# Patient Record
Sex: FEMALE | Race: WHITE | Employment: UNEMPLOYED | URBAN - METROPOLITAN AREA
[De-identification: names, ages, dates, MRNs, and addresses within clinical notes are randomized per-mention and may not be internally consistent; named-entity substitution may affect disease eponyms.]

---

## 2018-06-20 PROCEDURE — 88175 CYTOPATH C/V AUTO FLUID REDO: CPT | Performed by: OBSTETRICS & GYNECOLOGY

## 2019-02-04 NOTE — PROGRESS NOTES
HPI:   Donny Aguilar is a 50year old female who presents with right elbow pain     Pt had not played in years  Pt tried on line therapies and ice  It started in July ; better when she took a break from exercise   Pt went back to exercise and the triceps   EXTREMITIES: no cyanosis, clubbing or edema  NEURO: cranial nerves are intact,motor and sensory are grossly intact    ASSESSMENT AND PLAN:   Marquise Beebe is a 50year old female who presents for     1.  Right tennis elbow    - OP REFERRA

## 2019-02-05 ENCOUNTER — OFFICE VISIT (OUTPATIENT)
Dept: FAMILY MEDICINE CLINIC | Facility: CLINIC | Age: 49
End: 2019-02-05
Payer: OTHER GOVERNMENT

## 2019-02-05 VITALS
BODY MASS INDEX: 24.71 KG/M2 | OXYGEN SATURATION: 98 % | HEART RATE: 94 BPM | DIASTOLIC BLOOD PRESSURE: 78 MMHG | WEIGHT: 163 LBS | RESPIRATION RATE: 16 BRPM | TEMPERATURE: 98 F | SYSTOLIC BLOOD PRESSURE: 108 MMHG | HEIGHT: 68 IN

## 2019-02-05 DIAGNOSIS — M77.8 TRICEPS TENDINITIS: ICD-10-CM

## 2019-02-05 DIAGNOSIS — M77.11 RIGHT TENNIS ELBOW: Primary | ICD-10-CM

## 2019-02-05 PROCEDURE — 99202 OFFICE O/P NEW SF 15 MIN: CPT | Performed by: FAMILY MEDICINE

## 2019-02-05 RX ORDER — FLUCONAZOLE 150 MG/1
150 TABLET ORAL ONCE
COMMUNITY
End: 2019-10-15 | Stop reason: ALTCHOICE

## 2019-02-12 ENCOUNTER — HOSPITAL ENCOUNTER (OUTPATIENT)
Dept: PHYSICAL THERAPY | Facility: HOSPITAL | Age: 49
Setting detail: THERAPIES SERIES
Discharge: HOME | End: 2019-02-12
Attending: FAMILY MEDICINE
Payer: OTHER GOVERNMENT

## 2019-02-12 DIAGNOSIS — M77.8 TRICEPS TENDINITIS: ICD-10-CM

## 2019-02-12 DIAGNOSIS — M77.11 RIGHT TENNIS ELBOW: ICD-10-CM

## 2019-02-12 PROCEDURE — 97161 PT EVAL LOW COMPLEX 20 MIN: CPT

## 2019-02-12 PROCEDURE — 97110 THERAPEUTIC EXERCISES: CPT

## 2019-02-12 NOTE — PROGRESS NOTES
UPPER EXTREMITY EVALUATION:   Referring Physician: Dr. Robby Negro  Diagnosis: R tennis elbow, triceps tendinitis     Date of Service: 2/12/2019     PATIENT SUMMARY   Too Quintero is a 50year old RHD female who presents to therapy today with compla from a controlled tendon loading program.  [place] Reanna would benefit from skilled Physical Therapy to address the above impairments to decrease pain and return to prior level of function.     Precautions:  None  OBJECTIVE:   Cervical Screen:  Negative flexion, ex None  Rehab Potential:good    FOTO: 47/100      Patient/Family/Caregiver was advised of these findings, precautions, and treatment options and has agreed to actively participate in planning and for this course of care.     Thank you for your referral. Chris Weber

## 2019-02-14 ENCOUNTER — HOSPITAL ENCOUNTER (OUTPATIENT)
Dept: PHYSICAL THERAPY | Facility: HOSPITAL | Age: 49
Setting detail: THERAPIES SERIES
Discharge: HOME | End: 2019-02-14
Attending: FAMILY MEDICINE
Payer: OTHER GOVERNMENT

## 2019-02-14 PROCEDURE — 97140 MANUAL THERAPY 1/> REGIONS: CPT

## 2019-02-14 PROCEDURE — 97110 THERAPEUTIC EXERCISES: CPT

## 2019-02-14 NOTE — PHYSICAL THERAPY NOTE
Dx: R tennis elbow, tricep tendinitis         Authorized # of Visits:  8         Next MD visit: none scheduled  Fall Risk: standard         Precautions: n/a             Subjective: Sometimes good sometimes not. States he can perform wrist flexion w/o pain. 15                                                      Skilled Services: IAST[facility], joint mobilization, STM    Charges: manual x 2, therEx x 1       Total Timed Treatment: 45 min  Total Treatment Time: 45 min

## 2019-02-19 ENCOUNTER — HOSPITAL ENCOUNTER (OUTPATIENT)
Dept: PHYSICAL THERAPY | Facility: HOSPITAL | Age: 49
Setting detail: THERAPIES SERIES
Discharge: HOME | End: 2019-02-19
Attending: FAMILY MEDICINE
Payer: OTHER GOVERNMENT

## 2019-02-19 PROCEDURE — 97140 MANUAL THERAPY 1/> REGIONS: CPT

## 2019-02-19 PROCEDURE — 97110 THERAPEUTIC EXERCISES: CPT

## 2019-02-19 NOTE — PHYSICAL THERAPY NOTE
Dx: R tennis elbow, tricep tendinitis         Authorized # of Visits:  8         Next MD visit: none scheduled  Fall Risk: standard         Precautions: n/a             Subjective: Elbow feels the same. Difficulty with HEP, had exercises backward.  Gripping Supination YLW TB 2 x 15 Wrist extension 1# 1 x 15                                                     Skilled Services: joint mobilization    Charges: manual x 2, therEx x 1       Total Timed Treatment: 45 min  Total Treatment Time: 45 min

## 2019-02-21 ENCOUNTER — APPOINTMENT (OUTPATIENT)
Dept: PHYSICAL THERAPY | Facility: HOSPITAL | Age: 49
End: 2019-02-21
Attending: FAMILY MEDICINE
Payer: OTHER GOVERNMENT

## 2019-02-26 ENCOUNTER — APPOINTMENT (OUTPATIENT)
Dept: PHYSICAL THERAPY | Facility: HOSPITAL | Age: 49
End: 2019-02-26
Attending: FAMILY MEDICINE
Payer: OTHER GOVERNMENT

## 2019-02-28 ENCOUNTER — APPOINTMENT (OUTPATIENT)
Dept: PHYSICAL THERAPY | Facility: HOSPITAL | Age: 49
End: 2019-02-28
Attending: FAMILY MEDICINE
Payer: OTHER GOVERNMENT

## 2019-03-05 ENCOUNTER — HOSPITAL ENCOUNTER (OUTPATIENT)
Dept: PHYSICAL THERAPY | Facility: HOSPITAL | Age: 49
Setting detail: THERAPIES SERIES
Discharge: HOME | End: 2019-03-05
Attending: FAMILY MEDICINE
Payer: OTHER GOVERNMENT

## 2019-03-05 PROCEDURE — 97140 MANUAL THERAPY 1/> REGIONS: CPT

## 2019-03-05 NOTE — PHYSICAL THERAPY NOTE
Dx: R tennis elbow, tricep tendinitis         Authorized # of Visits:  8         Next MD visit: none scheduled  Fall Risk: standard         Precautions: n/a             Subjective: Elbow feels the same but tricep area feels better.  [place] extensors       Pronation 3# 2 x 15 Supination YLW TB 2 x 15 ICE       Supination YLW TB 2 x 15 Wrist extension 1# 1 x 15                                                     Skilled Services: joint mobilization, STM    Charges: manual x 3, ICE - NC       T

## 2019-03-07 ENCOUNTER — HOSPITAL ENCOUNTER (OUTPATIENT)
Dept: PHYSICAL THERAPY | Facility: HOSPITAL | Age: 49
Setting detail: THERAPIES SERIES
Discharge: HOME | End: 2019-03-07
Attending: FAMILY MEDICINE
Payer: OTHER GOVERNMENT

## 2019-03-07 PROCEDURE — 97140 MANUAL THERAPY 1/> REGIONS: CPT

## 2019-03-07 PROCEDURE — 97110 THERAPEUTIC EXERCISES: CPT

## 2019-03-07 NOTE — PHYSICAL THERAPY NOTE
Dx: R tennis elbow, tricep tendinitis         Authorized # of Visits:  8         Next MD visit: none scheduled  Fall Risk: standard         Precautions: n/a             Subjective: Elbow feels pretty good today. Does compensate with L arm.  Stretching it fe

## 2019-03-14 ENCOUNTER — HOSPITAL ENCOUNTER (OUTPATIENT)
Dept: PHYSICAL THERAPY | Facility: HOSPITAL | Age: 49
Setting detail: THERAPIES SERIES
Discharge: HOME | End: 2019-03-14
Attending: FAMILY MEDICINE
Payer: OTHER GOVERNMENT

## 2019-03-14 PROCEDURE — 97140 MANUAL THERAPY 1/> REGIONS: CPT

## 2019-03-14 PROCEDURE — 97110 THERAPEUTIC EXERCISES: CPT

## 2019-03-14 NOTE — PHYSICAL THERAPY NOTE
Dx: R tennis elbow, tricep tendinitis         Authorized # of Visits:  8         Next MD visit: none scheduled  Fall Risk: standard         Precautions: n/a             Subjective: Elbow is feeling better, able to  glass of water without pain.  No pa 1# 2 x 6     Supination YLW TB 2 x 15 Wrist extension 1# 1 x 15                                                     Skilled Services: [facility]    Charges: manual x 1, therEx x 2, ICE - NC       Total Timed Treatment: 45 min  Total Treatment Time: 45 min

## 2019-03-18 ENCOUNTER — HOSPITAL ENCOUNTER (OUTPATIENT)
Dept: PHYSICAL THERAPY | Facility: HOSPITAL | Age: 49
Setting detail: THERAPIES SERIES
Discharge: HOME | End: 2019-03-18
Attending: FAMILY MEDICINE
Payer: OTHER GOVERNMENT

## 2019-03-18 ENCOUNTER — APPOINTMENT (OUTPATIENT)
Dept: PHYSICAL THERAPY | Facility: HOSPITAL | Age: 49
End: 2019-03-18
Attending: FAMILY MEDICINE
Payer: OTHER GOVERNMENT

## 2019-03-18 PROCEDURE — 97140 MANUAL THERAPY 1/> REGIONS: CPT

## 2019-03-18 PROCEDURE — 97110 THERAPEUTIC EXERCISES: CPT

## 2019-03-18 NOTE — PHYSICAL THERAPY NOTE
Dx: R tennis elbow, tricep tendinitis         Authorized # of Visits:  8         Next MD visit: none scheduled  Fall Risk: standard         Precautions: n/a             Subjective: Elbow is feeling worse since last session, not as bad as initial. Waking up Blue TB 2 x 15 Pronation/supination Roller w/ 1# 3 x 5 Wrist ext 3# 3 x 10    Pronation 3# 2 x 15 Supination YLW TB 2 x 15 ICE  Radial Dev Roller w/ 1# 2 x 6 Pro/sup roller 3 x 10    Supination YLW TB 2 x 15 Wrist extension 1# 1 x 15    Radial dev roller 2

## 2019-03-21 ENCOUNTER — HOSPITAL ENCOUNTER (OUTPATIENT)
Dept: PHYSICAL THERAPY | Facility: HOSPITAL | Age: 49
Setting detail: THERAPIES SERIES
Discharge: HOME | End: 2019-03-21
Attending: FAMILY MEDICINE
Payer: OTHER GOVERNMENT

## 2019-03-21 PROCEDURE — 97110 THERAPEUTIC EXERCISES: CPT

## 2019-03-21 PROCEDURE — 97140 MANUAL THERAPY 1/> REGIONS: CPT

## 2019-03-21 NOTE — PHYSICAL THERAPY NOTE
Dx: R tennis elbow, tricep tendinitis         Authorized # of Visits:  8         Next MD visit: none scheduled  Fall Risk: standard         Precautions: n/a             Subjective: Feeling a little a better than last session.  Feels elbow is headed back in Pronation/Supination Blue TB 2 x 15 Pronation/supination Roller w/ 1# 3 x 5 Wrist ext 3# 3 x 10 Wrist Ext 2# 2 x 8 + dorsal radial glide   Pronation 3# 2 x 15 Supination YLW TB 2 x 15 ICE  Radial Dev Roller w/ 1# 2 x 6 Pro/sup roller 3 x 10 Wrist Ext YLW T

## 2019-04-02 ENCOUNTER — HOSPITAL ENCOUNTER (OUTPATIENT)
Dept: PHYSICAL THERAPY | Facility: HOSPITAL | Age: 49
Setting detail: THERAPIES SERIES
Discharge: HOME | End: 2019-04-02
Attending: FAMILY MEDICINE
Payer: OTHER GOVERNMENT

## 2019-04-02 ENCOUNTER — APPOINTMENT (OUTPATIENT)
Dept: PHYSICAL THERAPY | Facility: HOSPITAL | Age: 49
End: 2019-04-02
Payer: OTHER GOVERNMENT

## 2019-04-02 PROCEDURE — 97140 MANUAL THERAPY 1/> REGIONS: CPT

## 2019-04-02 PROCEDURE — 97110 THERAPEUTIC EXERCISES: CPT

## 2019-04-02 NOTE — PHYSICAL THERAPY NOTE
Dx: R tennis elbow, tricep tendinitis         Authorized # of Visits:  8         Next MD visit: none scheduled  Fall Risk: standard         Precautions: n/a             Subjective: Wrist is feeling maybe a bit better following trip, had to carry a lot of l 15 Wrist extension 2# 1 x 20, 3# 1 x 10, 5# 2 x 8 Radial dorsal glides Radial dorsal glides x 10' + between exercises MWM manual pressure on extensors sets of 15 between all exercises       AROM wrist ext - STM & rad head glide AROM wrist ext - STM & rad h

## 2019-04-04 ENCOUNTER — APPOINTMENT (OUTPATIENT)
Dept: PHYSICAL THERAPY | Facility: HOSPITAL | Age: 49
End: 2019-04-04
Payer: OTHER GOVERNMENT

## 2019-04-09 ENCOUNTER — HOSPITAL ENCOUNTER (OUTPATIENT)
Dept: PHYSICAL THERAPY | Facility: HOSPITAL | Age: 49
Setting detail: THERAPIES SERIES
Discharge: HOME | End: 2019-04-09
Attending: FAMILY MEDICINE
Payer: OTHER GOVERNMENT

## 2019-04-09 PROCEDURE — 97140 MANUAL THERAPY 1/> REGIONS: CPT

## 2019-04-09 PROCEDURE — 97110 THERAPEUTIC EXERCISES: CPT

## 2019-04-09 NOTE — PHYSICAL THERAPY NOTE
Dx: R tennis elbow, tricep tendinitis         Authorized # of Visits:  8         Next MD visit: none scheduled  Fall Risk: standard         Precautions: n/a             Subjective: It is better than the first day of PT. Feels it is 65% better.        Object exercises      AROM wrist ext - STM & rad head glide AROM wrist ext - STM & rad head glide STM to extensors Pronation/Supination Blue TB 2 x 15 Pronation/supination Roller w/ 1# 3 x 5 Wrist ext 3# 3 x 10 Wrist Ext 2# 2 x 8 + dorsal radial glide Wrist Ext/r

## 2019-04-11 ENCOUNTER — APPOINTMENT (OUTPATIENT)
Dept: PHYSICAL THERAPY | Facility: HOSPITAL | Age: 49
End: 2019-04-11
Payer: OTHER GOVERNMENT

## 2019-04-16 ENCOUNTER — HOSPITAL ENCOUNTER (OUTPATIENT)
Dept: PHYSICAL THERAPY | Facility: HOSPITAL | Age: 49
Setting detail: THERAPIES SERIES
Discharge: HOME | End: 2019-04-16
Attending: FAMILY MEDICINE
Payer: OTHER GOVERNMENT

## 2019-04-16 PROCEDURE — 97140 MANUAL THERAPY 1/> REGIONS: CPT

## 2019-04-16 PROCEDURE — 97110 THERAPEUTIC EXERCISES: CPT

## 2019-04-16 NOTE — PHYSICAL THERAPY NOTE
Dx: R tennis elbow, tricep tendinitis         Authorized # of Visits:  12         Next MD visit: none scheduled  Fall Risk: standard         Precautions: n/a             Subjective: Exercises at home with tennis racket were difficult and made the elbow hilary extensors sets of 15 between all exercises  MWM manual pressure on extensors x 5'    AROM wrist ext - STM & rad head glide AROM wrist ext - STM & rad head glide STM to extensors Pronation/Supination Blue TB 2 x 15 Pronation/supination Roller w/ 1# 3 x 5 Wr

## 2019-04-30 ENCOUNTER — HOSPITAL ENCOUNTER (OUTPATIENT)
Dept: PHYSICAL THERAPY | Facility: HOSPITAL | Age: 49
Setting detail: THERAPIES SERIES
Discharge: HOME | End: 2019-04-30
Attending: FAMILY MEDICINE
Payer: OTHER GOVERNMENT

## 2019-04-30 PROCEDURE — 97110 THERAPEUTIC EXERCISES: CPT

## 2019-04-30 NOTE — PHYSICAL THERAPY NOTE
Dx: R tennis elbow, tricep tendinitis         Authorized # of Visits:  12         Next MD visit: none scheduled  Fall Risk: standard         Precautions: n/a             Subjective: Getting frustrated with the elbow because it seems to not be making progre extension hammer 3 x 10 Tricep ext Red 3 x 15 Bodyblade 2 x 15\" each ER/IR, flex, abd   STM to extensor tendons & brachioradialis x 10' Radial deviation in elbow extension hammer 3 x 10 Rebounder throws 500g 3 x 10, 1kg 3 x 10 Punching foam pad 2 x 15 (im

## 2019-05-07 ENCOUNTER — HOSPITAL ENCOUNTER (OUTPATIENT)
Dept: PHYSICAL THERAPY | Facility: HOSPITAL | Age: 49
Setting detail: THERAPIES SERIES
Discharge: HOME | End: 2019-05-07
Attending: FAMILY MEDICINE
Payer: OTHER GOVERNMENT

## 2019-05-07 PROCEDURE — 97110 THERAPEUTIC EXERCISES: CPT

## 2019-05-07 NOTE — PHYSICAL THERAPY NOTE
Dx: R tennis elbow, tricep tendinitis         Authorized # of Visits:  15         Next MD visit: none scheduled  Fall Risk: standard         Precautions: n/a             Subjective: Feels that elbow is still the same and not making any progress.  Was ryanige fatigue (12-20) Pronation/supination in elbow extension hammer 3 x 10 Tricep ext Red 3 x 15 Bodyblade 2 x 15\" each ER/IR, flex, abd Bodyblade 2 x 20\" each ER/IR, flex, ab   STM to extensor tendons & brachioradialis x 10' Radial deviation in elbow extensi

## 2019-05-14 ENCOUNTER — APPOINTMENT (OUTPATIENT)
Dept: PHYSICAL THERAPY | Facility: HOSPITAL | Age: 49
End: 2019-05-14
Attending: FAMILY MEDICINE
Payer: OTHER GOVERNMENT

## 2019-05-21 ENCOUNTER — TELEPHONE (OUTPATIENT)
Dept: FAMILY MEDICINE CLINIC | Facility: CLINIC | Age: 49
End: 2019-05-21

## 2019-05-21 DIAGNOSIS — M77.11 LATERAL EPICONDYLITIS OF RIGHT ELBOW: Primary | ICD-10-CM

## 2019-05-21 DIAGNOSIS — Z12.39 SCREENING BREAST EXAMINATION: Primary | ICD-10-CM

## 2019-05-21 NOTE — TELEPHONE ENCOUNTER
Patient calling about right tennis elbow. Has done physical therapy, is about 70% better. She thinks she feels a bone spur on her elbow. Appt, referral to Ortho? Has pain if touched.

## 2019-05-21 NOTE — TELEPHONE ENCOUNTER
Patient had [facility] of breasts. Per Dr. Ge Muller patient needs mammogram.   Called patient to inform her of mammogram order and she stated she is going back to [facility] for other tests and will discuss with Florrie Nageotte .

## 2019-05-23 ENCOUNTER — HOSPITAL ENCOUNTER (OUTPATIENT)
Dept: PHYSICAL THERAPY | Facility: HOSPITAL | Age: 49
Setting detail: THERAPIES SERIES
Discharge: HOME | End: 2019-05-23
Attending: FAMILY MEDICINE
Payer: OTHER GOVERNMENT

## 2019-05-23 PROCEDURE — 97110 THERAPEUTIC EXERCISES: CPT

## 2019-05-23 NOTE — PHYSICAL THERAPY NOTE
Dx: R tennis elbow, tricep tendinitis         Authorized # of Visits:  15         Next MD visit: none scheduled  Fall Risk: standard         Precautions: n/a             Subjective: Feels that elbow is getting better following last session.  States she is a curl 5# 4 x fatigue (12-20) Pronation/supination in elbow extension hammer 3 x 10 Tricep ext Red 3 x 15 Bodyblade 2 x 15\" each ER/IR, flex, abd Bodyblade 2 x 20\" each ER/IR, flex, ab Tennis swing w/ TB serve, marianne, backhand x 10 each   STM to extenso

## 2019-10-15 ENCOUNTER — OFFICE VISIT (OUTPATIENT)
Dept: FAMILY MEDICINE CLINIC | Facility: CLINIC | Age: 49
End: 2019-10-15
Payer: OTHER GOVERNMENT

## 2019-10-15 VITALS
DIASTOLIC BLOOD PRESSURE: 64 MMHG | BODY MASS INDEX: 24.4 KG/M2 | SYSTOLIC BLOOD PRESSURE: 98 MMHG | RESPIRATION RATE: 16 BRPM | HEART RATE: 94 BPM | TEMPERATURE: 98 F | HEIGHT: 68 IN | WEIGHT: 161 LBS | OXYGEN SATURATION: 98 %

## 2019-10-15 DIAGNOSIS — Z00.00 GENERAL MEDICAL EXAM: Primary | ICD-10-CM

## 2019-10-15 DIAGNOSIS — E55.9 VITAMIN D DEFICIENCY: ICD-10-CM

## 2019-10-15 DIAGNOSIS — S76.311A STRAIN OF RIGHT HAMSTRING, INITIAL ENCOUNTER: ICD-10-CM

## 2019-10-15 DIAGNOSIS — R00.2 PALPITATIONS: ICD-10-CM

## 2019-10-15 PROCEDURE — 99214 OFFICE O/P EST MOD 30 MIN: CPT | Performed by: FAMILY MEDICINE

## 2019-10-15 NOTE — PROGRESS NOTES
oTo Quintero is a 52year old female here to discuss anxiety, palpations and       Pt in therapy for herself and in couples therapy - mom has dementia / daughter has depression / son has school refusal for gender dysphoria / step son passed away thyromegaly   HEART: normal   LUNGS: clear  Right hamstring: + tight and tender     ASSESSMENT AND PLAN:     1. General medical exam    - HEMOGLOBIN A1C; Future  - FREE T4 (FREE THYROXINE); Future  - ASSAY, THYROID STIM HORMONE; Future  - LIPID PANEL;  Futu

## 2019-10-16 ENCOUNTER — HOSPITAL ENCOUNTER (OUTPATIENT)
Dept: CV DIAGNOSTICS | Facility: HOSPITAL | Age: 49
Discharge: HOME | End: 2019-10-16
Attending: FAMILY MEDICINE
Payer: OTHER GOVERNMENT

## 2019-10-16 DIAGNOSIS — R94.31 ABNORMAL EKG: ICD-10-CM

## 2019-10-16 DIAGNOSIS — R00.2 INTERMITTENT PALPITATIONS: ICD-10-CM

## 2019-10-16 DIAGNOSIS — R00.2 PALPITATIONS: ICD-10-CM

## 2019-10-16 PROCEDURE — 93306 TTE W/DOPPLER COMPLETE: CPT | Performed by: FAMILY MEDICINE

## 2019-10-16 PROCEDURE — 93225 XTRNL ECG REC<48 HRS REC: CPT | Performed by: FAMILY MEDICINE

## 2019-10-16 PROCEDURE — 93226 XTRNL ECG REC<48 HR SCAN A/R: CPT | Performed by: FAMILY MEDICINE

## 2019-10-16 PROCEDURE — 93227 XTRNL ECG REC<48 HR R&I: CPT | Performed by: FAMILY MEDICINE

## 2019-10-21 ENCOUNTER — LAB ENCOUNTER (OUTPATIENT)
Dept: LAB | Facility: HOSPITAL | Age: 49
End: 2019-10-21
Attending: FAMILY MEDICINE
Payer: OTHER GOVERNMENT

## 2019-10-21 DIAGNOSIS — Z00.00 GENERAL MEDICAL EXAM: ICD-10-CM

## 2019-10-21 PROCEDURE — 82306 VITAMIN D 25 HYDROXY: CPT

## 2019-10-21 PROCEDURE — 84443 ASSAY THYROID STIM HORMONE: CPT

## 2019-10-21 PROCEDURE — 84439 ASSAY OF FREE THYROXINE: CPT

## 2019-10-21 PROCEDURE — 80061 LIPID PANEL: CPT

## 2019-10-21 PROCEDURE — 85025 COMPLETE CBC W/AUTO DIFF WBC: CPT

## 2019-10-21 PROCEDURE — 80053 COMPREHEN METABOLIC PANEL: CPT

## 2019-10-21 PROCEDURE — 83036 HEMOGLOBIN GLYCOSYLATED A1C: CPT

## 2019-10-21 PROCEDURE — 82607 VITAMIN B-12: CPT

## 2019-10-21 PROCEDURE — 36415 COLL VENOUS BLD VENIPUNCTURE: CPT

## 2019-12-04 ENCOUNTER — HOSPITAL ENCOUNTER (OUTPATIENT)
Dept: PHYSICAL THERAPY | Facility: HOSPITAL | Age: 49
Setting detail: THERAPIES SERIES
Discharge: HOME | End: 2019-12-04
Attending: FAMILY MEDICINE
Payer: OTHER GOVERNMENT

## 2019-12-04 DIAGNOSIS — S76.311A STRAIN OF RIGHT HAMSTRING, INITIAL ENCOUNTER: ICD-10-CM

## 2019-12-04 PROCEDURE — 97110 THERAPEUTIC EXERCISES: CPT

## 2019-12-04 PROCEDURE — 97161 PT EVAL LOW COMPLEX 20 MIN: CPT

## 2019-12-04 PROCEDURE — 97140 MANUAL THERAPY 1/> REGIONS: CPT

## 2019-12-04 NOTE — PROGRESS NOTES
LOWER EXTREMITY EVALUATION:   Referring Physician: Dr. Raina Murphy  Diagnosis: R hamstring strain     Date of Service: 12/4/2019     PATIENT SUMMARY   Warner Hess is a 52year old female who presents to therapy today with complaints of R hamstrin .intact light touch, symmetrically, no neuro complaints     AROM: (* denotes performed with pain)  Hip-wnl B throughout Knee-wnl B throughout Foot/Ankle-wnl B throughout             Lumbar flexion: 90 - tightness R>L             Extension 40     Accessory and run without limitations   · Pt will be independent and compliant with comprehensive HEP to maintain progress achieved in PT       Frequency / Duration: Patient will be seen for 2 x/week or a total of 8 visits over a 90 day period.  Treatment will includ

## 2019-12-09 ENCOUNTER — TELEPHONE (OUTPATIENT)
Dept: PHYSICAL THERAPY | Facility: HOSPITAL | Age: 49
End: 2019-12-09

## 2019-12-09 NOTE — TELEPHONE ENCOUNTER
Called pt regarding no show visit in physical therapy. Pt reports that she forgot. Confirmed next appt.

## 2019-12-10 ENCOUNTER — APPOINTMENT (OUTPATIENT)
Dept: PHYSICAL THERAPY | Facility: HOSPITAL | Age: 49
End: 2019-12-10
Attending: FAMILY MEDICINE
Payer: OTHER GOVERNMENT

## 2019-12-12 ENCOUNTER — HOSPITAL ENCOUNTER (OUTPATIENT)
Dept: PHYSICAL THERAPY | Facility: HOSPITAL | Age: 49
Setting detail: THERAPIES SERIES
Discharge: HOME | End: 2019-12-12
Attending: FAMILY MEDICINE
Payer: OTHER GOVERNMENT

## 2019-12-12 ENCOUNTER — OFFICE VISIT (OUTPATIENT)
Dept: FAMILY MEDICINE CLINIC | Facility: CLINIC | Age: 49
End: 2019-12-12
Payer: OTHER GOVERNMENT

## 2019-12-12 VITALS
TEMPERATURE: 99 F | OXYGEN SATURATION: 98 % | SYSTOLIC BLOOD PRESSURE: 102 MMHG | BODY MASS INDEX: 24.4 KG/M2 | DIASTOLIC BLOOD PRESSURE: 68 MMHG | WEIGHT: 161 LBS | HEART RATE: 59 BPM | RESPIRATION RATE: 16 BRPM | HEIGHT: 68 IN

## 2019-12-12 DIAGNOSIS — Z01.419 WELL WOMAN EXAM WITH ROUTINE GYNECOLOGICAL EXAM: Primary | ICD-10-CM

## 2019-12-12 DIAGNOSIS — Z12.31 ENCOUNTER FOR SCREENING MAMMOGRAM FOR HIGH-RISK PATIENT: ICD-10-CM

## 2019-12-12 DIAGNOSIS — Z00.00 ANNUAL PHYSICAL EXAM: ICD-10-CM

## 2019-12-12 DIAGNOSIS — Z12.11 COLON CANCER SCREENING: ICD-10-CM

## 2019-12-12 DIAGNOSIS — R00.2 PALPITATIONS: ICD-10-CM

## 2019-12-12 PROCEDURE — 88175 CYTOPATH C/V AUTO FLUID REDO: CPT | Performed by: FAMILY MEDICINE

## 2019-12-12 PROCEDURE — 99396 PREV VISIT EST AGE 40-64: CPT | Performed by: FAMILY MEDICINE

## 2019-12-12 PROCEDURE — 97110 THERAPEUTIC EXERCISES: CPT

## 2019-12-12 PROCEDURE — 87624 HPV HI-RISK TYP POOLED RSLT: CPT | Performed by: FAMILY MEDICINE

## 2019-12-12 PROCEDURE — 97140 MANUAL THERAPY 1/> REGIONS: CPT

## 2019-12-12 NOTE — PROGRESS NOTES
HPI:   Alba Josue is a 52year old female who presents for a complete physical exam.     Wt Readings from Last 6 Encounters:  12/12/19 : 161 lb (73 kg)  10/15/19 : 161 lb (73 kg)  02/05/19 : 163 lb (73.9 kg)  06/20/18 : 165 lb 12.8 oz (75.2 kg) • Other (Alzheimers) Paternal Grandmother    • Other (Heart failure) Paternal Grandfather    • Breast Cancer Neg       Social History:   Social History    Tobacco Use      Smoking status: Never Smoker      Smokeless tobacco: Never Used    Alcohol use: Mela Vasquez negative  MUSCULOSKELETAL: back is not tender,FROM of the back  EXTREMITIES: no cyanosis, clubbing or edema  NEURO: cranial nerves are intact,motor and sensory are grossly intact    ASSESSMENT AND PLAN:   Nitish Fleming is a 52year old female who

## 2019-12-12 NOTE — PROGRESS NOTES
Dx:  R hamstring strain           Authorized # of Visits:  No limit, no auth         Next MD visit: none scheduled  Fall Risk: standard         Precautions: n/a             Subjective: Had pain with running R hamstring.   Current functional limitations/Init wasn't noted at Initial evaluation: during 1x manual muscle testing.       Goals:   Goals: (to be met in 8 visits)-progressing  · Pt will improve hip ext and ABD to 5-/5 to increase ease with running   · Pt will improve hip flexibility to wfl to decrease st

## 2019-12-16 ENCOUNTER — HOSPITAL ENCOUNTER (OUTPATIENT)
Dept: PHYSICAL THERAPY | Facility: HOSPITAL | Age: 49
Setting detail: THERAPIES SERIES
Discharge: HOME | End: 2019-12-16
Attending: FAMILY MEDICINE
Payer: OTHER GOVERNMENT

## 2019-12-16 PROCEDURE — 97140 MANUAL THERAPY 1/> REGIONS: CPT

## 2019-12-16 PROCEDURE — 97110 THERAPEUTIC EXERCISES: CPT

## 2019-12-16 NOTE — PROGRESS NOTES
Dx:  R hamstring strain           Authorized # of Visits:  No limit, no auth         Next MD visit: none scheduled  Fall Risk: standard         Precautions: n/a             Subjective: R hamstring feeling better. Tape helped with decreasing pain.   Current hamstring strengthening 3 ways x10 , prone hamstring strengthening YTB prn x10 BID    Goals:   Goals: (to be met in 8 visits)-progressing  · Pt will improve hip ext and ABD to 5-/5 to increase ease with running   · Pt will improve hip flexibility to wfl to Time: 45 min

## 2019-12-23 ENCOUNTER — HOSPITAL ENCOUNTER (OUTPATIENT)
Dept: PHYSICAL THERAPY | Facility: HOSPITAL | Age: 49
Setting detail: THERAPIES SERIES
Discharge: HOME | End: 2019-12-23
Attending: FAMILY MEDICINE
Payer: OTHER GOVERNMENT

## 2019-12-23 PROCEDURE — 97035 APP MDLTY 1+ULTRASOUND EA 15: CPT

## 2019-12-23 PROCEDURE — 97140 MANUAL THERAPY 1/> REGIONS: CPT

## 2019-12-23 NOTE — PROGRESS NOTES
Dx:  R hamstring strain           Authorized # of Visits:  No limit, no auth         Next MD visit: none scheduled  Fall Risk: standard         Precautions: n/a             Subjective: Patient reports R hamstring doesn't seem to be improving, stating she s tightness/tenderness with palpation to assist with improved hamstring length to -20 B and more neutral posture to improve ability to walk and run without limitations   · Pt will be independent and compliant with comprehensive HEP to maintain progress achie

## 2019-12-26 ENCOUNTER — HOSPITAL ENCOUNTER (OUTPATIENT)
Dept: PHYSICAL THERAPY | Facility: HOSPITAL | Age: 49
Setting detail: THERAPIES SERIES
Discharge: HOME | End: 2019-12-26
Attending: FAMILY MEDICINE
Payer: OTHER GOVERNMENT

## 2019-12-26 PROCEDURE — 97035 APP MDLTY 1+ULTRASOUND EA 15: CPT

## 2019-12-26 PROCEDURE — 97140 MANUAL THERAPY 1/> REGIONS: CPT

## 2019-12-26 NOTE — PROGRESS NOTES
Dx:  R hamstring strain           Authorized # of Visits:  No limit, no auth         Next MD visit: none scheduled  Fall Risk: standard         Precautions: n/a             Subjective: Patient reports her hamstring felt much following last PT session with with improved hamstring length to -20 B and more neutral posture to improve ability to walk and run without limitations   · Pt will be independent and compliant with comprehensive HEP to maintain progress achieved in PT     Plan: Continue per plan of care.

## 2019-12-31 ENCOUNTER — HOSPITAL ENCOUNTER (OUTPATIENT)
Dept: PHYSICAL THERAPY | Facility: HOSPITAL | Age: 49
Setting detail: THERAPIES SERIES
Discharge: HOME | End: 2019-12-31
Attending: FAMILY MEDICINE
Payer: OTHER GOVERNMENT

## 2019-12-31 PROCEDURE — 97035 APP MDLTY 1+ULTRASOUND EA 15: CPT

## 2019-12-31 PROCEDURE — 97140 MANUAL THERAPY 1/> REGIONS: CPT

## 2019-12-31 NOTE — PROGRESS NOTES
Dx:  R hamstring strain           Authorized # of Visits:  No limit, no auth         Next MD visit: none scheduled  Fall Risk: standard         Precautions: n/a             Subjective: R hamstring is feeling better with combination of stretching, strengthe hamstring tightness/tenderness with palpation to assist with improved hamstring length to -20 B and more neutral posture to improve ability to walk and run without limitations   · Pt will be independent and compliant with comprehensive HEP to maintain prog

## 2020-01-02 ENCOUNTER — APPOINTMENT (OUTPATIENT)
Dept: PHYSICAL THERAPY | Facility: HOSPITAL | Age: 50
End: 2020-01-02
Attending: FAMILY MEDICINE
Payer: OTHER GOVERNMENT

## 2020-01-07 ENCOUNTER — APPOINTMENT (OUTPATIENT)
Dept: PHYSICAL THERAPY | Facility: HOSPITAL | Age: 50
End: 2020-01-07
Payer: OTHER GOVERNMENT

## 2020-01-09 ENCOUNTER — OFFICE VISIT (OUTPATIENT)
Dept: PHYSICAL THERAPY | Facility: HOSPITAL | Age: 50
End: 2020-01-09
Attending: FAMILY MEDICINE
Payer: OTHER GOVERNMENT

## 2020-01-09 PROCEDURE — 97140 MANUAL THERAPY 1/> REGIONS: CPT

## 2020-01-09 PROCEDURE — 97035 APP MDLTY 1+ULTRASOUND EA 15: CPT

## 2020-01-09 NOTE — PROGRESS NOTES
Dx:  R hamstring strain           Authorized # of Visits:  No limit, no auth         Next MD visit: none scheduled  Fall Risk: standard         Precautions: n/a             Subjective: Patient reports her hamstring had been feeling better but she played te YTB 3x10-HEP    Standing HS stretch x1'-HEP    MARINO x10-HEP    DUGLAS x5 min-HEP   supine Sciatic nn glides R/L x20, HSS x1' R/L    Dynamic hamstring stretching , hamstring  contraction x1' ea    Sitting-  Pulleys  Knee flexion  YTB 3x10    Prone-MRE's R hams

## 2020-01-14 ENCOUNTER — OFFICE VISIT (OUTPATIENT)
Dept: PHYSICAL THERAPY | Facility: HOSPITAL | Age: 50
End: 2020-01-14
Attending: FAMILY MEDICINE
Payer: OTHER GOVERNMENT

## 2020-01-14 PROCEDURE — 97140 MANUAL THERAPY 1/> REGIONS: CPT

## 2020-01-14 PROCEDURE — 97035 APP MDLTY 1+ULTRASOUND EA 15: CPT

## 2020-01-14 NOTE — PROGRESS NOTES
Dx:  R hamstring strain           Authorized # of Visits:  No limit, no auth         Next MD visit: none scheduled  Fall Risk: standard         Precautions: n/a             Subjective: Patient reports she continues to not some discomfort in the medial hams x1'-HEP    MARINO x10-HEP    DUGLAS x5 min-HEP   supine Sciatic nn glides R/L x20, HSS x1' R/L    Dynamic hamstring stretching , hamstring  contraction x1' ea    Sitting-  Pulleys  Knee flexion  YTB 3x10    Prone-MRE's R hamstring 3 ways x10-HEP    Prone-hamstr

## 2020-01-16 ENCOUNTER — APPOINTMENT (OUTPATIENT)
Dept: PHYSICAL THERAPY | Facility: HOSPITAL | Age: 50
End: 2020-01-16
Attending: FAMILY MEDICINE
Payer: OTHER GOVERNMENT

## 2020-01-16 PROCEDURE — 97035 APP MDLTY 1+ULTRASOUND EA 15: CPT

## 2020-01-16 PROCEDURE — 97140 MANUAL THERAPY 1/> REGIONS: CPT

## 2020-01-16 PROCEDURE — 97110 THERAPEUTIC EXERCISES: CPT

## 2020-01-16 NOTE — PROGRESS NOTES
Dx:  R hamstring strain           Authorized # of Visits:  No limit, no auth         Next MD visit: none scheduled  Fall Risk: standard         Precautions: n/a             Subjective: Patient reports hamstring has been feeling really good since her visit ea-HEP    Sitting-  Pulleys  Knee flexion  YTB 3x10-HEP    Standing HS stretch x1'-HEP    MARINO x10-HEP    DUGLAS x5 min-HEP   supine Sciatic nn glides R/L x20, HSS x1' R/L    Dynamic hamstring stretching , hamstring  contraction x1' ea    Sitting-  Pulleys  K

## 2020-01-21 ENCOUNTER — OFFICE VISIT (OUTPATIENT)
Dept: PHYSICAL THERAPY | Facility: HOSPITAL | Age: 50
End: 2020-01-21
Attending: FAMILY MEDICINE
Payer: OTHER GOVERNMENT

## 2020-01-21 PROCEDURE — 97140 MANUAL THERAPY 1/> REGIONS: CPT

## 2020-01-21 PROCEDURE — 97035 APP MDLTY 1+ULTRASOUND EA 15: CPT

## 2020-01-21 NOTE — PROGRESS NOTES
Dx:  R hamstring strain           Authorized # of Visits:  No limit, no auth         Next MD visit: none scheduled  Fall Risk: standard         Precautions: n/a             Subjective: Patient reports R hip is feeling better but she is still aware of some musculotendinous junction medial HS x3'    GIASTM to R HS x15' total    Proximal HSS 2x30\"    Piriformis stretch 2x30\"        Charges: US,  2 MAN Total Timed Treatment: 48 min     Total Treatment Time: 48 min

## 2020-01-23 ENCOUNTER — TELEPHONE (OUTPATIENT)
Dept: PHYSICAL THERAPY | Facility: HOSPITAL | Age: 50
End: 2020-01-23

## 2020-01-23 ENCOUNTER — APPOINTMENT (OUTPATIENT)
Dept: PHYSICAL THERAPY | Facility: HOSPITAL | Age: 50
End: 2020-01-23
Payer: OTHER GOVERNMENT

## 2020-01-23 ENCOUNTER — OFFICE VISIT (OUTPATIENT)
Dept: PHYSICAL THERAPY | Facility: HOSPITAL | Age: 50
End: 2020-01-23
Attending: FAMILY MEDICINE
Payer: OTHER GOVERNMENT

## 2020-01-23 PROCEDURE — 97110 THERAPEUTIC EXERCISES: CPT

## 2020-01-23 PROCEDURE — 97035 APP MDLTY 1+ULTRASOUND EA 15: CPT

## 2020-01-23 PROCEDURE — 97140 MANUAL THERAPY 1/> REGIONS: CPT

## 2020-01-24 ENCOUNTER — TELEPHONE (OUTPATIENT)
Dept: FAMILY MEDICINE CLINIC | Facility: CLINIC | Age: 50
End: 2020-01-24

## 2020-01-24 NOTE — PROGRESS NOTES
Dx:  R hamstring strain           Authorized # of Visits:  No limit, no auth         Next MD visit: none scheduled  Fall Risk: standard         Precautions: n/a             Subjective: Patient reports hamstring has been feeling good but she had a massage y 2x30\"    Pt edu and HEP modification per above DTM R hip flexor/TFL/glut med x10'    ITB foam roll R x3'    R hip posterior-inferior and lateral glides w/ mob eblt in 90 deg flxn grade2-3    Quadruped rock back with towel roll for posterior glide x10    H

## 2020-01-24 NOTE — TELEPHONE ENCOUNTER
Patient needs  a letter stating that is was necessary for Dr. Terra Farah to check her vitamin d on 10/21/19  Her insurance does not want to cover it  She is sending in an appeal and needs a letter from the doctor with it  Please advise

## 2020-01-27 ENCOUNTER — TELEPHONE (OUTPATIENT)
Dept: PHYSICAL THERAPY | Facility: HOSPITAL | Age: 50
End: 2020-01-27

## 2020-01-28 ENCOUNTER — APPOINTMENT (OUTPATIENT)
Dept: PHYSICAL THERAPY | Facility: HOSPITAL | Age: 50
End: 2020-01-28
Attending: FAMILY MEDICINE
Payer: OTHER GOVERNMENT

## 2020-01-30 ENCOUNTER — APPOINTMENT (OUTPATIENT)
Dept: PHYSICAL THERAPY | Facility: HOSPITAL | Age: 50
End: 2020-01-30
Attending: FAMILY MEDICINE
Payer: OTHER GOVERNMENT

## 2020-02-13 ENCOUNTER — OFFICE VISIT (OUTPATIENT)
Dept: PHYSICAL THERAPY | Facility: HOSPITAL | Age: 50
End: 2020-02-13
Attending: FAMILY MEDICINE
Payer: OTHER GOVERNMENT

## 2020-02-13 PROCEDURE — 97110 THERAPEUTIC EXERCISES: CPT

## 2020-02-13 PROCEDURE — 97140 MANUAL THERAPY 1/> REGIONS: CPT

## 2020-02-13 NOTE — PROGRESS NOTES
Dx:  R hamstring strain           Authorized # of Visits:  No limit, no auth         Next MD visit: none scheduled  Fall Risk: standard         Precautions: n/a             Subjective: Patient reports hamstring has been doing well with only occasional pain flexor/TFL/glut med x10'    ITB foam roll R x3'    R hip posterior-inferior and lateral glides w/ mob eblt in 90 deg flxn grade2-3    Quadruped rock back with towel roll for posterior glide x10    HSS x30\" B    Piriformis stretch x30\" B DTM to proximal m

## 2020-03-10 ENCOUNTER — OFFICE VISIT (OUTPATIENT)
Dept: PHYSICAL THERAPY | Facility: HOSPITAL | Age: 50
End: 2020-03-10
Attending: FAMILY MEDICINE
Payer: OTHER GOVERNMENT

## 2020-03-10 PROCEDURE — 97110 THERAPEUTIC EXERCISES: CPT

## 2020-03-10 PROCEDURE — 97035 APP MDLTY 1+ULTRASOUND EA 15: CPT

## 2020-03-10 PROCEDURE — 97140 MANUAL THERAPY 1/> REGIONS: CPT

## 2020-03-10 NOTE — PROGRESS NOTES
Dx:  R hamstring strain           Authorized # of Visits:  No limit, no auth         Next MD visit: none scheduled  Fall Risk: standard         Precautions: n/a             Subjective: Patient reports her hamstring has been doing very well and is feeling a to R HS x8' total    Proximal HSS 2x30\"    Piriformis stretch 2x30\"     DTM to proximal medial HS x15'    GIASTM to R HS x13' total    Proximal HSS 2x30\"    Piriformis stretch 2x30\"      KT R medial hamstring-prox>distal    Reviewed stretching HEP DTM

## 2020-03-27 ENCOUNTER — TELEPHONE (OUTPATIENT)
Dept: PHYSICAL THERAPY | Facility: HOSPITAL | Age: 50
End: 2020-03-27

## 2020-03-27 NOTE — TELEPHONE ENCOUNTER
Contacted pt to discuss department's initiative to protect all non-essential and high risk patients from COVID-19 exposure. Discussed recommendations relative to pt's home program and communication via email as needed if status changes.   Patient does not w

## 2020-04-07 ENCOUNTER — APPOINTMENT (OUTPATIENT)
Dept: PHYSICAL THERAPY | Facility: HOSPITAL | Age: 50
End: 2020-04-07
Attending: FAMILY MEDICINE
Payer: OTHER GOVERNMENT

## 2020-04-09 ENCOUNTER — TELEPHONE (OUTPATIENT)
Dept: FAMILY MEDICINE CLINIC | Facility: CLINIC | Age: 50
End: 2020-04-09

## 2020-04-09 NOTE — TELEPHONE ENCOUNTER
Insurance did not cover vitamin D testing due to coding issue - code should be [id number], vitamin D dificiency  Pls call to discuss

## 2020-04-30 NOTE — TELEPHONE ENCOUNTER
Faxed corrected code E55.9 per Dr. Ana Almeida to [address] [place] [address] to resubmit to [facility] insurance.

## 2021-02-05 ENCOUNTER — LAB ENCOUNTER (OUTPATIENT)
Dept: LAB | Age: 51
End: 2021-02-05
Attending: PHYSICIAN ASSISTANT
Payer: OTHER GOVERNMENT

## 2021-02-05 ENCOUNTER — OFFICE VISIT (OUTPATIENT)
Dept: FAMILY MEDICINE CLINIC | Facility: CLINIC | Age: 51
End: 2021-02-05
Payer: OTHER GOVERNMENT

## 2021-02-05 VITALS
SYSTOLIC BLOOD PRESSURE: 104 MMHG | DIASTOLIC BLOOD PRESSURE: 72 MMHG | HEIGHT: 68 IN | BODY MASS INDEX: 25.79 KG/M2 | WEIGHT: 170.19 LBS | HEART RATE: 68 BPM | RESPIRATION RATE: 20 BRPM | TEMPERATURE: 98 F | OXYGEN SATURATION: 98 %

## 2021-02-05 DIAGNOSIS — Z13.1 SCREENING FOR DIABETES MELLITUS (DM): ICD-10-CM

## 2021-02-05 DIAGNOSIS — R00.2 POUNDING HEARTBEAT: Primary | ICD-10-CM

## 2021-02-05 DIAGNOSIS — R00.2 POUNDING HEARTBEAT: ICD-10-CM

## 2021-02-05 DIAGNOSIS — R30.0 DYSURIA: ICD-10-CM

## 2021-02-05 LAB
ALBUMIN SERPL-MCNC: 3.9 G/DL (ref 3.4–5)
ALBUMIN/GLOB SERPL: 1.1 {RATIO} (ref 1–2)
ALP LIVER SERPL-CCNC: 92 U/L
ALT SERPL-CCNC: 30 U/L
ANION GAP SERPL CALC-SCNC: 3 MMOL/L (ref 0–18)
AST SERPL-CCNC: 14 U/L (ref 15–37)
BASOPHILS # BLD AUTO: 0.06 X10(3) UL (ref 0–0.2)
BASOPHILS NFR BLD AUTO: 1.1 %
BILIRUB SERPL-MCNC: 1.3 MG/DL (ref 0.1–2)
BILIRUB UR QL STRIP.AUTO: NEGATIVE
BUN BLD-MCNC: 11 MG/DL (ref 7–18)
BUN/CREAT SERPL: 14.9 (ref 10–20)
CALCIUM BLD-MCNC: 9.2 MG/DL (ref 8.5–10.1)
CHLORIDE SERPL-SCNC: 107 MMOL/L (ref 98–112)
CLARITY UR REFRACT.AUTO: CLEAR
CO2 SERPL-SCNC: 27 MMOL/L (ref 21–32)
CREAT BLD-MCNC: 0.74 MG/DL
DEPRECATED HBV CORE AB SER IA-ACNC: 74.1 NG/ML
DEPRECATED RDW RBC AUTO: 41.8 FL (ref 35.1–46.3)
EOSINOPHIL # BLD AUTO: 0.07 X10(3) UL (ref 0–0.7)
EOSINOPHIL NFR BLD AUTO: 1.3 %
ERYTHROCYTE [DISTWIDTH] IN BLOOD BY AUTOMATED COUNT: 13.5 % (ref 11–15)
EST. AVERAGE GLUCOSE BLD GHB EST-MCNC: 103 MG/DL (ref 68–126)
GLOBULIN PLAS-MCNC: 3.4 G/DL (ref 2.8–4.4)
GLUCOSE BLD-MCNC: 85 MG/DL (ref 70–99)
GLUCOSE UR STRIP.AUTO-MCNC: NEGATIVE MG/DL
HAV IGM SER QL: 2.4 MG/DL (ref 1.6–2.6)
HBA1C MFR BLD HPLC: 5.2 % (ref ?–5.7)
HCT VFR BLD AUTO: 41.2 %
HGB BLD-MCNC: 13.6 G/DL
IMM GRANULOCYTES # BLD AUTO: 0.01 X10(3) UL (ref 0–1)
IMM GRANULOCYTES NFR BLD: 0.2 %
KETONES UR STRIP.AUTO-MCNC: NEGATIVE MG/DL
LYMPHOCYTES # BLD AUTO: 1.35 X10(3) UL (ref 1–4)
LYMPHOCYTES NFR BLD AUTO: 25.5 %
M PROTEIN MFR SERPL ELPH: 7.3 G/DL (ref 6.4–8.2)
MCH RBC QN AUTO: 28 PG (ref 26–34)
MCHC RBC AUTO-ENTMCNC: 33 G/DL (ref 31–37)
MCV RBC AUTO: 84.9 FL
MONOCYTES # BLD AUTO: 0.57 X10(3) UL (ref 0.1–1)
MONOCYTES NFR BLD AUTO: 10.8 %
NEUTROPHILS # BLD AUTO: 3.24 X10 (3) UL (ref 1.5–7.7)
NEUTROPHILS # BLD AUTO: 3.24 X10(3) UL (ref 1.5–7.7)
NEUTROPHILS NFR BLD AUTO: 61.1 %
NITRITE UR QL STRIP.AUTO: NEGATIVE
OSMOLALITY SERPL CALC.SUM OF ELEC: 283 MOSM/KG (ref 275–295)
PATIENT FASTING Y/N/NP: YES
PH UR STRIP.AUTO: 7 [PH] (ref 4.5–8)
PLATELET # BLD AUTO: 305 10(3)UL (ref 150–450)
POTASSIUM SERPL-SCNC: 4.3 MMOL/L (ref 3.5–5.1)
PROT UR STRIP.AUTO-MCNC: NEGATIVE MG/DL
RBC # BLD AUTO: 4.85 X10(6)UL
RBC UR QL AUTO: NEGATIVE
SODIUM SERPL-SCNC: 137 MMOL/L (ref 136–145)
SP GR UR STRIP.AUTO: 1.01 (ref 1–1.03)
T3FREE SERPL-MCNC: 1.81 PG/ML (ref 2.4–4.2)
T4 FREE SERPL-MCNC: 1.1 NG/DL (ref 0.8–1.7)
TSI SER-ACNC: 1.46 MIU/ML (ref 0.36–3.74)
UROBILINOGEN UR STRIP.AUTO-MCNC: <2 MG/DL
WBC # BLD AUTO: 5.3 X10(3) UL (ref 4–11)

## 2021-02-05 PROCEDURE — 99214 OFFICE O/P EST MOD 30 MIN: CPT | Performed by: PHYSICIAN ASSISTANT

## 2021-02-05 PROCEDURE — 83036 HEMOGLOBIN GLYCOSYLATED A1C: CPT

## 2021-02-05 PROCEDURE — 84443 ASSAY THYROID STIM HORMONE: CPT

## 2021-02-05 PROCEDURE — 80053 COMPREHEN METABOLIC PANEL: CPT

## 2021-02-05 PROCEDURE — 3074F SYST BP LT 130 MM HG: CPT | Performed by: PHYSICIAN ASSISTANT

## 2021-02-05 PROCEDURE — 84481 FREE ASSAY (FT-3): CPT

## 2021-02-05 PROCEDURE — 36415 COLL VENOUS BLD VENIPUNCTURE: CPT

## 2021-02-05 PROCEDURE — 83735 ASSAY OF MAGNESIUM: CPT

## 2021-02-05 PROCEDURE — 85025 COMPLETE CBC W/AUTO DIFF WBC: CPT

## 2021-02-05 PROCEDURE — 3008F BODY MASS INDEX DOCD: CPT | Performed by: PHYSICIAN ASSISTANT

## 2021-02-05 PROCEDURE — 82728 ASSAY OF FERRITIN: CPT

## 2021-02-05 PROCEDURE — 81001 URINALYSIS AUTO W/SCOPE: CPT

## 2021-02-05 PROCEDURE — 84439 ASSAY OF FREE THYROXINE: CPT

## 2021-02-05 PROCEDURE — 3078F DIAST BP <80 MM HG: CPT | Performed by: PHYSICIAN ASSISTANT

## 2021-02-05 NOTE — PROGRESS NOTES
HPI:    Patient ID: Steven Salazar is a 48year old female. HPI   Patient presents today with concerns of pounding heartbeat that she feels in her throat. Symptoms started yesterday morning and have been nonstop since.   She could not sleep well Hematological: Negative for adenopathy. Psychiatric/Behavioral: The patient is not nervous/anxious.              Current Outpatient Medications   Medication Sig Dispense Refill   • PEG 3350-KCl-NaBcb-NaCl-NaSulf (PEG 3350/ELECTROLYTES) 240 g Oral Recon PLATELET; Future  - COMP METABOLIC PANEL (14); Future  - ASSAY, THYROID STIM HORMONE; Future  - FREE T3 (TRIIODOTHYRONINE); Future  - FREE T4 (FREE THYROXINE); Future  - FERRITIN; Future  - MAGNESIUM; Future    2.  Dysuria  Check UA and follow-up pending re

## 2021-05-24 ENCOUNTER — HOSPITAL ENCOUNTER (OUTPATIENT)
Dept: CT IMAGING | Facility: HOSPITAL | Age: 51
Discharge: HOME | End: 2021-05-24
Attending: FAMILY MEDICINE

## 2021-05-24 DIAGNOSIS — Z13.6 SCREENING FOR CARDIOVASCULAR CONDITION: ICD-10-CM

## 2021-06-04 ENCOUNTER — PATIENT MESSAGE (OUTPATIENT)
Dept: FAMILY MEDICINE CLINIC | Facility: CLINIC | Age: 51
End: 2021-06-04

## 2021-06-04 DIAGNOSIS — Z13.220 SCREENING, LIPID: Primary | ICD-10-CM

## 2021-06-07 NOTE — TELEPHONE ENCOUNTER
From: Brandon Oconnell  To: Sam Jaramillo PA-C  Sent: 6/4/2021 9:58 PM CDT  Subject: Test Results Question    Hi, on 2/5/21 I did labwork.  I see notes saying cholesterol is fine but I can't see the lipid panel showing LDL/HDL,etc. DID that get downl

## 2021-08-17 ENCOUNTER — OFFICE VISIT (OUTPATIENT)
Dept: FAMILY MEDICINE CLINIC | Facility: CLINIC | Age: 51
End: 2021-08-17
Payer: OTHER GOVERNMENT

## 2021-08-17 ENCOUNTER — LAB ENCOUNTER (OUTPATIENT)
Dept: LAB | Age: 51
End: 2021-08-17
Attending: PHYSICIAN ASSISTANT
Payer: OTHER GOVERNMENT

## 2021-08-17 VITALS
BODY MASS INDEX: 24.71 KG/M2 | HEART RATE: 67 BPM | DIASTOLIC BLOOD PRESSURE: 68 MMHG | RESPIRATION RATE: 16 BRPM | WEIGHT: 163 LBS | HEIGHT: 68 IN | OXYGEN SATURATION: 97 % | SYSTOLIC BLOOD PRESSURE: 106 MMHG

## 2021-08-17 DIAGNOSIS — T14.8XXA SPLINTER: Primary | ICD-10-CM

## 2021-08-17 DIAGNOSIS — Z13.220 SCREENING, LIPID: ICD-10-CM

## 2021-08-17 LAB
CHOLEST SMN-MCNC: 223 MG/DL (ref ?–200)
HDLC SERPL-MCNC: 81 MG/DL (ref 40–59)
LDLC SERPL CALC-MCNC: 134 MG/DL (ref ?–100)
NONHDLC SERPL-MCNC: 142 MG/DL (ref ?–130)
PATIENT FASTING Y/N/NP: YES
TRIGL SERPL-MCNC: 49 MG/DL (ref 30–149)
VLDLC SERPL CALC-MCNC: 9 MG/DL (ref 0–30)

## 2021-08-17 PROCEDURE — 3078F DIAST BP <80 MM HG: CPT | Performed by: FAMILY MEDICINE

## 2021-08-17 PROCEDURE — 3074F SYST BP LT 130 MM HG: CPT | Performed by: FAMILY MEDICINE

## 2021-08-17 PROCEDURE — 80061 LIPID PANEL: CPT | Performed by: PHYSICIAN ASSISTANT

## 2021-08-17 PROCEDURE — 3008F BODY MASS INDEX DOCD: CPT | Performed by: FAMILY MEDICINE

## 2021-08-17 PROCEDURE — 99213 OFFICE O/P EST LOW 20 MIN: CPT | Performed by: FAMILY MEDICINE

## 2021-08-17 NOTE — PROGRESS NOTES
[facility] Progress Note    SUBJECTIVE: Andi Vargasbartolome Oconnell 46year old female is here today for Patient presents with:  Foreign Body: possible splinter on bottom of Lt foot      Stepped on something in the garden, believes a splinter months a

## 2022-04-14 ENCOUNTER — OFFICE VISIT (OUTPATIENT)
Dept: FAMILY MEDICINE CLINIC | Facility: CLINIC | Age: 52
End: 2022-04-14
Payer: OTHER GOVERNMENT

## 2022-04-14 VITALS
HEART RATE: 78 BPM | HEIGHT: 65 IN | WEIGHT: 161.63 LBS | OXYGEN SATURATION: 98 % | BODY MASS INDEX: 26.93 KG/M2 | DIASTOLIC BLOOD PRESSURE: 69 MMHG | SYSTOLIC BLOOD PRESSURE: 105 MMHG | RESPIRATION RATE: 18 BRPM

## 2022-04-14 DIAGNOSIS — R10.9 FLANK PAIN: Primary | ICD-10-CM

## 2022-04-14 DIAGNOSIS — Z00.00 GENERAL MEDICAL EXAM: ICD-10-CM

## 2022-04-14 DIAGNOSIS — Z81.8 FAMILY HISTORY OF DEMENTIA: ICD-10-CM

## 2022-04-14 DIAGNOSIS — M76.51 PATELLAR TENDINITIS OF RIGHT KNEE: ICD-10-CM

## 2022-04-14 DIAGNOSIS — Z83.3 FAMILY HISTORY OF DIABETES MELLITUS: ICD-10-CM

## 2022-04-14 LAB
APPEARANCE: CLEAR
BILIRUBIN: NEGATIVE
GLUCOSE (URINE DIPSTICK): NEGATIVE MG/DL
KETONES (URINE DIPSTICK): NEGATIVE MG/DL
LEUKOCYTES: NEGATIVE
MULTISTIX LOT#: NORMAL NUMERIC
NITRITE, URINE: NEGATIVE
OCCULT BLOOD: NEGATIVE
PH, URINE: 7 (ref 4.5–8)
PROTEIN (URINE DIPSTICK): NEGATIVE MG/DL
SPECIFIC GRAVITY: 1.02 (ref 1–1.03)
URINE-COLOR: YELLOW
UROBILINOGEN,SEMI-QN: 0.2 MG/DL (ref 0–1.9)

## 2022-04-14 PROCEDURE — 3078F DIAST BP <80 MM HG: CPT | Performed by: FAMILY MEDICINE

## 2022-04-14 PROCEDURE — 3008F BODY MASS INDEX DOCD: CPT | Performed by: FAMILY MEDICINE

## 2022-04-14 PROCEDURE — 87086 URINE CULTURE/COLONY COUNT: CPT | Performed by: FAMILY MEDICINE

## 2022-04-14 PROCEDURE — 81003 URINALYSIS AUTO W/O SCOPE: CPT | Performed by: FAMILY MEDICINE

## 2022-04-14 PROCEDURE — 3074F SYST BP LT 130 MM HG: CPT | Performed by: FAMILY MEDICINE

## 2022-04-14 PROCEDURE — 99214 OFFICE O/P EST MOD 30 MIN: CPT | Performed by: FAMILY MEDICINE

## 2022-05-14 ENCOUNTER — LAB ENCOUNTER (OUTPATIENT)
Dept: LAB | Facility: HOSPITAL | Age: 52
End: 2022-05-14
Attending: INTERNAL MEDICINE
Payer: OTHER GOVERNMENT

## 2022-05-14 DIAGNOSIS — Z01.818 PRE-OP TESTING: ICD-10-CM

## 2022-05-15 LAB — SARS-COV-2 RNA RESP QL NAA+PROBE: NOT DETECTED

## 2022-05-17 PROBLEM — Z12.11 SPECIAL SCREENING FOR MALIGNANT NEOPLASM OF COLON: Status: ACTIVE | Noted: 2022-05-17

## 2022-05-18 ENCOUNTER — OFFICE VISIT (OUTPATIENT)
Dept: FAMILY MEDICINE CLINIC | Facility: CLINIC | Age: 52
End: 2022-05-18
Payer: OTHER GOVERNMENT

## 2022-05-18 ENCOUNTER — LAB ENCOUNTER (OUTPATIENT)
Dept: LAB | Age: 52
End: 2022-05-18
Attending: FAMILY MEDICINE
Payer: OTHER GOVERNMENT

## 2022-05-18 VITALS — OXYGEN SATURATION: 99 % | TEMPERATURE: 98 F | HEART RATE: 77 BPM

## 2022-05-18 DIAGNOSIS — Z00.00 GENERAL MEDICAL EXAM: ICD-10-CM

## 2022-05-18 DIAGNOSIS — L03.115 CELLULITIS OF RIGHT LOWER EXTREMITY: Primary | ICD-10-CM

## 2022-05-18 LAB
ALBUMIN SERPL-MCNC: 3.6 G/DL (ref 3.4–5)
ALBUMIN/GLOB SERPL: 1 {RATIO} (ref 1–2)
ALP LIVER SERPL-CCNC: 79 U/L
ALT SERPL-CCNC: 31 U/L
ANION GAP SERPL CALC-SCNC: 2 MMOL/L (ref 0–18)
AST SERPL-CCNC: 16 U/L (ref 15–37)
BASOPHILS # BLD AUTO: 0.05 X10(3) UL (ref 0–0.2)
BASOPHILS NFR BLD AUTO: 1 %
BILIRUB SERPL-MCNC: 0.7 MG/DL (ref 0.1–2)
BUN BLD-MCNC: 10 MG/DL (ref 7–18)
CALCIUM BLD-MCNC: 9.2 MG/DL (ref 8.5–10.1)
CHLORIDE SERPL-SCNC: 108 MMOL/L (ref 98–112)
CHOLEST SERPL-MCNC: 176 MG/DL (ref ?–200)
CO2 SERPL-SCNC: 29 MMOL/L (ref 21–32)
CREAT BLD-MCNC: 0.86 MG/DL
CRP SERPL-MCNC: 1.25 MG/DL (ref ?–0.3)
EOSINOPHIL # BLD AUTO: 0.08 X10(3) UL (ref 0–0.7)
EOSINOPHIL NFR BLD AUTO: 1.6 %
ERYTHROCYTE [DISTWIDTH] IN BLOOD BY AUTOMATED COUNT: 13 %
EST. AVERAGE GLUCOSE BLD GHB EST-MCNC: 114 MG/DL (ref 68–126)
FASTING PATIENT LIPID ANSWER: YES
FASTING STATUS PATIENT QL REPORTED: YES
GLOBULIN PLAS-MCNC: 3.5 G/DL (ref 2.8–4.4)
GLUCOSE BLD-MCNC: 90 MG/DL (ref 70–99)
HBA1C MFR BLD: 5.6 % (ref ?–5.7)
HCT VFR BLD AUTO: 39.5 %
HDLC SERPL-MCNC: 73 MG/DL (ref 40–59)
HGB BLD-MCNC: 12.8 G/DL
IMM GRANULOCYTES # BLD AUTO: 0.01 X10(3) UL (ref 0–1)
IMM GRANULOCYTES NFR BLD: 0.2 %
INSULIN SERPL-ACNC: 2.8 MU/L (ref 3–25)
LDLC SERPL CALC-MCNC: 93 MG/DL (ref ?–100)
LIPASE SERPL-CCNC: 76 U/L (ref 73–393)
LYMPHOCYTES # BLD AUTO: 1.17 X10(3) UL (ref 1–4)
LYMPHOCYTES NFR BLD AUTO: 23.3 %
MCH RBC QN AUTO: 27.9 PG (ref 26–34)
MCHC RBC AUTO-ENTMCNC: 32.4 G/DL (ref 31–37)
MCV RBC AUTO: 86.2 FL
MONOCYTES # BLD AUTO: 0.36 X10(3) UL (ref 0.1–1)
MONOCYTES NFR BLD AUTO: 7.2 %
NEUTROPHILS # BLD AUTO: 3.36 X10 (3) UL (ref 1.5–7.7)
NEUTROPHILS # BLD AUTO: 3.36 X10(3) UL (ref 1.5–7.7)
NEUTROPHILS NFR BLD AUTO: 66.7 %
NONHDLC SERPL-MCNC: 103 MG/DL (ref ?–130)
OSMOLALITY SERPL CALC.SUM OF ELEC: 287 MOSM/KG (ref 275–295)
PLATELET # BLD AUTO: 275 10(3)UL (ref 150–450)
POTASSIUM SERPL-SCNC: 4.9 MMOL/L (ref 3.5–5.1)
PROT SERPL-MCNC: 7.1 G/DL (ref 6.4–8.2)
RBC # BLD AUTO: 4.58 X10(6)UL
SODIUM SERPL-SCNC: 139 MMOL/L (ref 136–145)
T4 FREE SERPL-MCNC: 1.1 NG/DL (ref 0.8–1.7)
TRIGL SERPL-MCNC: 48 MG/DL (ref 30–149)
TSI SER-ACNC: 0.81 MIU/ML (ref 0.36–3.74)
VIT B12 SERPL-MCNC: 1382 PG/ML (ref 193–986)
VLDLC SERPL CALC-MCNC: 8 MG/DL (ref 0–30)
WBC # BLD AUTO: 5 X10(3) UL (ref 4–11)

## 2022-05-18 PROCEDURE — 86140 C-REACTIVE PROTEIN: CPT | Performed by: FAMILY MEDICINE

## 2022-05-18 PROCEDURE — 99213 OFFICE O/P EST LOW 20 MIN: CPT | Performed by: FAMILY MEDICINE

## 2022-05-18 PROCEDURE — 84439 ASSAY OF FREE THYROXINE: CPT | Performed by: FAMILY MEDICINE

## 2022-05-18 PROCEDURE — 83690 ASSAY OF LIPASE: CPT | Performed by: FAMILY MEDICINE

## 2022-05-18 PROCEDURE — 83036 HEMOGLOBIN GLYCOSYLATED A1C: CPT | Performed by: FAMILY MEDICINE

## 2022-05-18 PROCEDURE — 82607 VITAMIN B-12: CPT | Performed by: FAMILY MEDICINE

## 2022-05-18 PROCEDURE — 83525 ASSAY OF INSULIN: CPT | Performed by: FAMILY MEDICINE

## 2022-05-18 PROCEDURE — 80050 GENERAL HEALTH PANEL: CPT | Performed by: FAMILY MEDICINE

## 2022-05-18 PROCEDURE — 80061 LIPID PANEL: CPT | Performed by: FAMILY MEDICINE

## 2022-05-18 RX ORDER — AMOXICILLIN AND CLAVULANATE POTASSIUM 875; 125 MG/1; MG/1
1 TABLET, FILM COATED ORAL 2 TIMES DAILY
Qty: 14 TABLET | Refills: 0 | Status: SHIPPED | OUTPATIENT
Start: 2022-05-18 | End: 2022-05-25

## 2022-05-18 NOTE — PATIENT INSTRUCTIONS
Apply mupirocin ointment 2-3 times daily. Keep wound clean and dry. Keep covered until adequate scab layer develops, but you may keep open to the air for a few hours at night. If redness, swelling and pain increase, start oral antibiotics. Take antibiotics with food and plenty of water. Eat yogurt or take probiotic daily. (Geo Dale is a good example of an OTC probiotic)  Make sure to finish the entire antibiotic treatment. Increase fluids and rest.     Monitor symptoms closely and follow-up with PCP if no better in 2-3 days.

## 2022-05-25 ENCOUNTER — PATIENT MESSAGE (OUTPATIENT)
Dept: FAMILY MEDICINE CLINIC | Facility: CLINIC | Age: 52
End: 2022-05-25

## 2022-05-26 NOTE — TELEPHONE ENCOUNTER
From: Bobby Oconnell  To: Weston Razo DO  Sent: 5/25/2022 6:34 PM CDT  Subject: leg infection    My leg is slowly getting better but I'm nearly out of my cream and as the scap is still healing I think I should get some more prescription mupirocin ointment. The walk in clinic ordered it. Can you order me some more? If so, can I get fro my closer pharmacy [facility] in [place]? PS: I had to take the oral antibiotics ad it was continuing to get worse so I couldn't walk anymore. I have 2 pills left and it's starting to reduce in redness and I'm walking well. I'll keep an eye on and if it gets worse after oral antibiotics run out, I will contact you.  Thanks

## 2022-05-27 NOTE — TELEPHONE ENCOUNTER
Pt got mupirocin ointment from [facility] and she would like refill. Pended if agreeable. I can tell pt to follow up if she continues to have issues or scab is not healed fully, soon.

## 2022-06-01 ENCOUNTER — OFFICE VISIT (OUTPATIENT)
Dept: FAMILY MEDICINE CLINIC | Facility: CLINIC | Age: 52
End: 2022-06-01
Payer: OTHER GOVERNMENT

## 2022-06-01 VITALS
OXYGEN SATURATION: 98 % | DIASTOLIC BLOOD PRESSURE: 62 MMHG | SYSTOLIC BLOOD PRESSURE: 108 MMHG | RESPIRATION RATE: 16 BRPM | HEIGHT: 68.5 IN | HEART RATE: 64 BPM | WEIGHT: 162 LBS | BODY MASS INDEX: 24.27 KG/M2

## 2022-06-01 DIAGNOSIS — Z23 NEED FOR TDAP VACCINATION: ICD-10-CM

## 2022-06-01 DIAGNOSIS — S66.811D: Primary | ICD-10-CM

## 2022-06-01 PROCEDURE — 90715 TDAP VACCINE 7 YRS/> IM: CPT | Performed by: FAMILY MEDICINE

## 2022-06-01 PROCEDURE — 3078F DIAST BP <80 MM HG: CPT | Performed by: FAMILY MEDICINE

## 2022-06-01 PROCEDURE — 3074F SYST BP LT 130 MM HG: CPT | Performed by: FAMILY MEDICINE

## 2022-06-01 PROCEDURE — 99213 OFFICE O/P EST LOW 20 MIN: CPT | Performed by: FAMILY MEDICINE

## 2022-06-01 PROCEDURE — 90471 IMMUNIZATION ADMIN: CPT | Performed by: FAMILY MEDICINE

## 2022-06-01 PROCEDURE — 3008F BODY MASS INDEX DOCD: CPT | Performed by: FAMILY MEDICINE

## 2022-06-26 ENCOUNTER — PATIENT MESSAGE (OUTPATIENT)
Dept: FAMILY MEDICINE CLINIC | Facility: CLINIC | Age: 52
End: 2022-06-26

## 2022-06-27 ENCOUNTER — TELEMEDICINE (OUTPATIENT)
Dept: FAMILY MEDICINE CLINIC | Facility: CLINIC | Age: 52
End: 2022-06-27

## 2022-06-27 DIAGNOSIS — U07.1 COVID-19: Primary | ICD-10-CM

## 2022-06-27 RX ORDER — FLUTICASONE PROPIONATE 50 MCG
2 SPRAY, SUSPENSION (ML) NASAL DAILY
Qty: 1 EACH | Refills: 1 | Status: SHIPPED | OUTPATIENT
Start: 2022-06-27

## 2022-06-27 RX ORDER — BENZONATATE 200 MG/1
200 CAPSULE ORAL EVERY 8 HOURS PRN
Qty: 30 CAPSULE | Refills: 0 | Status: SHIPPED | OUTPATIENT
Start: 2022-06-27

## 2022-06-27 NOTE — TELEPHONE ENCOUNTER
From: Graciela Oconnell  To: Dung Lopez DO  Sent: 6/26/2022 11:03 PM CDT  Subject: COVID positive    I was wondering if I can do a telehealth appt Monday as I tested positive for COVID tonight (Sunday) and would like to see if I can get ivermecrin? thanks!

## 2022-06-30 ENCOUNTER — PATIENT MESSAGE (OUTPATIENT)
Dept: FAMILY MEDICINE CLINIC | Facility: CLINIC | Age: 52
End: 2022-06-30

## 2022-06-30 NOTE — TELEPHONE ENCOUNTER
Patient advised to make an appointment so assess her symptoms such as lymph nodes and to run a strep culture.

## 2022-06-30 NOTE — TELEPHONE ENCOUNTER
From: Howie Oconnell  To: Carmelita Randall DO  Sent: 6/30/2022 3:13 AM CDT  Subject: severe throat pain    Hi doc,  My throat has been extremely painful last 48 hours and over the counter losenges aren't helping. I'm drinking hot tea but swallowing is just agonizing right now (middle of night). Is it possible I have strep or is there anything you can give me? My neck lymph node glands seem to be slightly enlarged recently too.  thanks :(

## 2022-10-08 ENCOUNTER — PATIENT MESSAGE (OUTPATIENT)
Dept: FAMILY MEDICINE CLINIC | Facility: CLINIC | Age: 52
End: 2022-10-08

## 2022-10-10 NOTE — TELEPHONE ENCOUNTER
From: Laurent Oconnell  To: Danni Brewer,   Sent: 10/8/2022 9:45 PM CDT  Subject: Halle Davis - maintain his meds    Meera's current doctor of endocrinology out of [facility] is pediatric. (meera just turned 18) We met with Dr. Danelle Carlin in September and he said Meera's primary (you) could maintain his norethindrone (hormone Progesterone) 5 mg going forward as he's stable and hasn't changed anything for several years. Would that be ok? Dr. Danelle Carlin renewed his scripts for another 6 months at least. I know you saw Halle Davis recently. Please confirm you can re-order his norethindrone when it's needed in the future.      Thanks kindly,  Neil Mckinney

## 2023-10-04 ENCOUNTER — LAB ENCOUNTER (OUTPATIENT)
Dept: LAB | Age: 53
End: 2023-10-04
Attending: FAMILY MEDICINE
Payer: OTHER GOVERNMENT

## 2023-10-04 ENCOUNTER — OFFICE VISIT (OUTPATIENT)
Dept: FAMILY MEDICINE CLINIC | Facility: CLINIC | Age: 53
End: 2023-10-04
Payer: OTHER GOVERNMENT

## 2023-10-04 VITALS
HEIGHT: 68.5 IN | RESPIRATION RATE: 16 BRPM | BODY MASS INDEX: 25.32 KG/M2 | SYSTOLIC BLOOD PRESSURE: 110 MMHG | OXYGEN SATURATION: 97 % | DIASTOLIC BLOOD PRESSURE: 64 MMHG | WEIGHT: 169 LBS | HEART RATE: 76 BPM

## 2023-10-04 DIAGNOSIS — Z00.00 ANNUAL PHYSICAL EXAM: Primary | ICD-10-CM

## 2023-10-04 DIAGNOSIS — Z00.00 ANNUAL PHYSICAL EXAM: ICD-10-CM

## 2023-10-04 LAB
ALBUMIN SERPL-MCNC: 3.9 G/DL (ref 3.4–5)
ALBUMIN/GLOB SERPL: 1.1 {RATIO} (ref 1–2)
ALP LIVER SERPL-CCNC: 93 U/L
ALT SERPL-CCNC: 28 U/L
ANION GAP SERPL CALC-SCNC: 4 MMOL/L (ref 0–18)
AST SERPL-CCNC: 16 U/L (ref 15–37)
BASOPHILS # BLD AUTO: 0.06 X10(3) UL (ref 0–0.2)
BASOPHILS NFR BLD AUTO: 1.2 %
BILIRUB SERPL-MCNC: 1.3 MG/DL (ref 0.1–2)
BUN BLD-MCNC: 16 MG/DL (ref 7–18)
CALCIUM BLD-MCNC: 8.9 MG/DL (ref 8.5–10.1)
CHLORIDE SERPL-SCNC: 106 MMOL/L (ref 98–112)
CHOLEST SERPL-MCNC: 213 MG/DL (ref ?–200)
CO2 SERPL-SCNC: 26 MMOL/L (ref 21–32)
CREAT BLD-MCNC: 0.88 MG/DL
EGFRCR SERPLBLD CKD-EPI 2021: 79 ML/MIN/1.73M2 (ref 60–?)
EOSINOPHIL # BLD AUTO: 0.11 X10(3) UL (ref 0–0.7)
EOSINOPHIL NFR BLD AUTO: 2.3 %
ERYTHROCYTE [DISTWIDTH] IN BLOOD BY AUTOMATED COUNT: 13.4 %
FASTING PATIENT LIPID ANSWER: NO
FASTING STATUS PATIENT QL REPORTED: NO
GLOBULIN PLAS-MCNC: 3.5 G/DL (ref 2.8–4.4)
GLUCOSE BLD-MCNC: 100 MG/DL (ref 70–99)
HCT VFR BLD AUTO: 41.5 %
HDLC SERPL-MCNC: 102 MG/DL (ref 40–59)
HGB BLD-MCNC: 13.5 G/DL
IMM GRANULOCYTES # BLD AUTO: 0.01 X10(3) UL (ref 0–1)
IMM GRANULOCYTES NFR BLD: 0.2 %
LDLC SERPL CALC-MCNC: 103 MG/DL (ref ?–100)
LYMPHOCYTES # BLD AUTO: 1.17 X10(3) UL (ref 1–4)
LYMPHOCYTES NFR BLD AUTO: 24.1 %
MCH RBC QN AUTO: 28.2 PG (ref 26–34)
MCHC RBC AUTO-ENTMCNC: 32.5 G/DL (ref 31–37)
MCV RBC AUTO: 86.8 FL
MONOCYTES # BLD AUTO: 0.39 X10(3) UL (ref 0.1–1)
MONOCYTES NFR BLD AUTO: 8 %
NEUTROPHILS # BLD AUTO: 3.11 X10 (3) UL (ref 1.5–7.7)
NEUTROPHILS # BLD AUTO: 3.11 X10(3) UL (ref 1.5–7.7)
NEUTROPHILS NFR BLD AUTO: 64.2 %
NONHDLC SERPL-MCNC: 111 MG/DL (ref ?–130)
OSMOLALITY SERPL CALC.SUM OF ELEC: 283 MOSM/KG (ref 275–295)
PLATELET # BLD AUTO: 296 10(3)UL (ref 150–450)
POTASSIUM SERPL-SCNC: 4.8 MMOL/L (ref 3.5–5.1)
PROT SERPL-MCNC: 7.4 G/DL (ref 6.4–8.2)
RBC # BLD AUTO: 4.78 X10(6)UL
SODIUM SERPL-SCNC: 136 MMOL/L (ref 136–145)
T4 FREE SERPL-MCNC: 1 NG/DL (ref 0.8–1.7)
TRIGL SERPL-MCNC: 46 MG/DL (ref 30–149)
TSI SER-ACNC: 0.98 MIU/ML (ref 0.36–3.74)
VIT B12 SERPL-MCNC: 1444 PG/ML (ref 193–986)
VIT D+METAB SERPL-MCNC: 37.9 NG/ML (ref 30–100)
VLDLC SERPL CALC-MCNC: 8 MG/DL (ref 0–30)
WBC # BLD AUTO: 4.9 X10(3) UL (ref 4–11)

## 2023-10-04 PROCEDURE — 3078F DIAST BP <80 MM HG: CPT | Performed by: FAMILY MEDICINE

## 2023-10-04 PROCEDURE — 3008F BODY MASS INDEX DOCD: CPT | Performed by: FAMILY MEDICINE

## 2023-10-04 PROCEDURE — 84439 ASSAY OF FREE THYROXINE: CPT

## 2023-10-04 PROCEDURE — 36415 COLL VENOUS BLD VENIPUNCTURE: CPT

## 2023-10-04 PROCEDURE — 85025 COMPLETE CBC W/AUTO DIFF WBC: CPT

## 2023-10-04 PROCEDURE — 83036 HEMOGLOBIN GLYCOSYLATED A1C: CPT

## 2023-10-04 PROCEDURE — 82607 VITAMIN B-12: CPT

## 2023-10-04 PROCEDURE — 80053 COMPREHEN METABOLIC PANEL: CPT

## 2023-10-04 PROCEDURE — 3074F SYST BP LT 130 MM HG: CPT | Performed by: FAMILY MEDICINE

## 2023-10-04 PROCEDURE — 84443 ASSAY THYROID STIM HORMONE: CPT

## 2023-10-04 PROCEDURE — 80061 LIPID PANEL: CPT

## 2023-10-04 PROCEDURE — 99396 PREV VISIT EST AGE 40-64: CPT | Performed by: FAMILY MEDICINE

## 2023-10-04 PROCEDURE — 82306 VITAMIN D 25 HYDROXY: CPT

## 2023-10-05 LAB
EST. AVERAGE GLUCOSE BLD GHB EST-MCNC: 128 MG/DL (ref 68–126)
HBA1C MFR BLD: 6.1 % (ref ?–5.7)

## 2024-01-01 ENCOUNTER — PATIENT MESSAGE (OUTPATIENT)
Dept: FAMILY MEDICINE CLINIC | Facility: CLINIC | Age: 54
End: 2024-01-01

## 2024-01-02 NOTE — TELEPHONE ENCOUNTER
From: Tereza Oconnell  To: Theresa Kaiser  Sent: 1/1/2024 4:44 PM CST  Subject: Appt Jan 8?    I have an appt with Dr. Kaiser on my calendar for Jan 8 @3:30. It's not showing on MYCHART so want to be sure it's still there. Follow up on A1C level high AND also bunion starting.   thanks and Happy New Year!

## 2024-01-30 ENCOUNTER — OFFICE VISIT (OUTPATIENT)
Dept: FAMILY MEDICINE CLINIC | Facility: CLINIC | Age: 54
End: 2024-01-30
Payer: OTHER GOVERNMENT

## 2024-01-30 ENCOUNTER — LAB ENCOUNTER (OUTPATIENT)
Dept: LAB | Age: 54
End: 2024-01-30
Attending: FAMILY MEDICINE
Payer: OTHER GOVERNMENT

## 2024-01-30 VITALS
BODY MASS INDEX: 25.47 KG/M2 | SYSTOLIC BLOOD PRESSURE: 122 MMHG | RESPIRATION RATE: 16 BRPM | DIASTOLIC BLOOD PRESSURE: 78 MMHG | WEIGHT: 170 LBS | OXYGEN SATURATION: 97 % | HEIGHT: 68.5 IN | HEART RATE: 92 BPM

## 2024-01-30 DIAGNOSIS — R73.9 HYPERGLYCEMIA: ICD-10-CM

## 2024-01-30 DIAGNOSIS — R01.1 NEWLY RECOGNIZED HEART MURMUR: ICD-10-CM

## 2024-01-30 DIAGNOSIS — E78.00 ELEVATED CHOLESTEROL: ICD-10-CM

## 2024-01-30 DIAGNOSIS — H91.93 BILATERAL HEARING LOSS, UNSPECIFIED HEARING LOSS TYPE: ICD-10-CM

## 2024-01-30 DIAGNOSIS — Z12.31 ENCOUNTER FOR SCREENING MAMMOGRAM FOR HIGH-RISK PATIENT: ICD-10-CM

## 2024-01-30 DIAGNOSIS — R00.2 PALPITATIONS: ICD-10-CM

## 2024-01-30 DIAGNOSIS — Z79.890 HORMONE REPLACEMENT THERAPY (HRT): Primary | ICD-10-CM

## 2024-01-30 LAB
ALBUMIN SERPL-MCNC: 3.9 G/DL (ref 3.4–5)
ALBUMIN/GLOB SERPL: 1.2 {RATIO} (ref 1–2)
ALP LIVER SERPL-CCNC: 74 U/L
ANION GAP SERPL CALC-SCNC: 4 MMOL/L (ref 0–18)
AST SERPL-CCNC: 11 U/L (ref 15–37)
BASOPHILS # BLD AUTO: 0.06 X10(3) UL (ref 0–0.2)
BASOPHILS NFR BLD AUTO: 1.1 %
BILIRUB SERPL-MCNC: 0.7 MG/DL (ref 0.1–2)
BUN BLD-MCNC: 16 MG/DL (ref 9–23)
CALCIUM BLD-MCNC: 8.9 MG/DL (ref 8.5–10.1)
CHLORIDE SERPL-SCNC: 109 MMOL/L (ref 98–112)
CHOLEST SERPL-MCNC: 232 MG/DL (ref ?–200)
CO2 SERPL-SCNC: 28 MMOL/L (ref 21–32)
CREAT BLD-MCNC: 0.73 MG/DL
DEPRECATED HBV CORE AB SER IA-ACNC: 30.7 NG/ML
EGFRCR SERPLBLD CKD-EPI 2021: 98 ML/MIN/1.73M2 (ref 60–?)
EOSINOPHIL # BLD AUTO: 0.09 X10(3) UL (ref 0–0.7)
EOSINOPHIL NFR BLD AUTO: 1.7 %
ERYTHROCYTE [DISTWIDTH] IN BLOOD BY AUTOMATED COUNT: 13.2 %
EST. AVERAGE GLUCOSE BLD GHB EST-MCNC: 108 MG/DL (ref 68–126)
FASTING PATIENT LIPID ANSWER: YES
FASTING STATUS PATIENT QL REPORTED: YES
GLOBULIN PLAS-MCNC: 3.2 G/DL (ref 2.8–4.4)
GLUCOSE BLD-MCNC: 86 MG/DL (ref 70–99)
HBA1C MFR BLD: 5.4 % (ref ?–5.7)
HCT VFR BLD AUTO: 40.1 %
HDLC SERPL-MCNC: 98 MG/DL (ref 40–59)
HGB BLD-MCNC: 12.8 G/DL
IMM GRANULOCYTES # BLD AUTO: 0.01 X10(3) UL (ref 0–1)
IMM GRANULOCYTES NFR BLD: 0.2 %
IRON SATN MFR SERPL: 14 %
IRON SERPL-MCNC: 51 UG/DL
LDLC SERPL CALC-MCNC: 126 MG/DL (ref ?–100)
LYMPHOCYTES # BLD AUTO: 1.21 X10(3) UL (ref 1–4)
LYMPHOCYTES NFR BLD AUTO: 22.5 %
MAGNESIUM SERPL-MCNC: 2.3 MG/DL (ref 1.6–2.6)
MCH RBC QN AUTO: 27.8 PG (ref 26–34)
MCHC RBC AUTO-ENTMCNC: 31.9 G/DL (ref 31–37)
MCV RBC AUTO: 87 FL
MONOCYTES # BLD AUTO: 0.44 X10(3) UL (ref 0.1–1)
MONOCYTES NFR BLD AUTO: 8.2 %
NEUTROPHILS # BLD AUTO: 3.57 X10 (3) UL (ref 1.5–7.7)
NEUTROPHILS # BLD AUTO: 3.57 X10(3) UL (ref 1.5–7.7)
NEUTROPHILS NFR BLD AUTO: 66.3 %
NONHDLC SERPL-MCNC: 134 MG/DL (ref ?–130)
OSMOLALITY SERPL CALC.SUM OF ELEC: 292 MOSM/KG (ref 275–295)
PLATELET # BLD AUTO: 282 10(3)UL (ref 150–450)
POTASSIUM SERPL-SCNC: 4.4 MMOL/L (ref 3.5–5.1)
PROT SERPL-MCNC: 7.1 G/DL (ref 6.4–8.2)
RBC # BLD AUTO: 4.61 X10(6)UL
SODIUM SERPL-SCNC: 141 MMOL/L (ref 136–145)
TIBC SERPL-MCNC: 353 UG/DL (ref 240–450)
TRANSFERRIN SERPL-MCNC: 237 MG/DL (ref 200–360)
TRIGL SERPL-MCNC: 48 MG/DL (ref 30–149)
VLDLC SERPL CALC-MCNC: 8 MG/DL (ref 0–30)
WBC # BLD AUTO: 5.4 X10(3) UL (ref 4–11)

## 2024-01-30 PROCEDURE — 80061 LIPID PANEL: CPT

## 2024-01-30 PROCEDURE — 85025 COMPLETE CBC W/AUTO DIFF WBC: CPT

## 2024-01-30 PROCEDURE — 36415 COLL VENOUS BLD VENIPUNCTURE: CPT

## 2024-01-30 PROCEDURE — 3008F BODY MASS INDEX DOCD: CPT | Performed by: FAMILY MEDICINE

## 2024-01-30 PROCEDURE — 3074F SYST BP LT 130 MM HG: CPT | Performed by: FAMILY MEDICINE

## 2024-01-30 PROCEDURE — 83735 ASSAY OF MAGNESIUM: CPT

## 2024-01-30 PROCEDURE — 83550 IRON BINDING TEST: CPT

## 2024-01-30 PROCEDURE — 80053 COMPREHEN METABOLIC PANEL: CPT

## 2024-01-30 PROCEDURE — 82728 ASSAY OF FERRITIN: CPT

## 2024-01-30 PROCEDURE — 83036 HEMOGLOBIN GLYCOSYLATED A1C: CPT

## 2024-01-30 PROCEDURE — 99214 OFFICE O/P EST MOD 30 MIN: CPT | Performed by: FAMILY MEDICINE

## 2024-01-30 PROCEDURE — 83540 ASSAY OF IRON: CPT

## 2024-01-30 PROCEDURE — 3078F DIAST BP <80 MM HG: CPT | Performed by: FAMILY MEDICINE

## 2024-01-30 RX ORDER — MULTIVIT-MIN/IRON FUM/FOLIC AC 7.5 MG-4
1 TABLET ORAL DAILY
COMMUNITY

## 2024-01-30 RX ORDER — PROGESTERONE 100 MG/1
1 CAPSULE ORAL NIGHTLY
COMMUNITY
Start: 2023-07-12

## 2024-01-30 NOTE — PROGRESS NOTES
HPI:   Tereza Oconnell is a 53 year old female who presents with MMP    On keto   Some palpitations which happens when she follows this diet   Pt has had a holter in the   Patient denies chest pain, shortness of breath, dizziness, and lightheadedness.   No exertional symptoms.    Working out often - not meeting her weight goals    On bio-identical hormones   Continues to have cycles   Mammogram overdue       Current Outpatient Medications   Medication Sig Dispense Refill    progesterone 100 MG Oral Cap Take 1 capsule (100 mg total) by mouth nightly.      Cholecalciferol 50 MCG (2000 UT) Oral Cap Take by mouth As Directed.      ZINC OR Use as directed in the mouth or throat.      Multiple Vitamins-Minerals (MULTI-VITAMIN/MINERALS) Oral Tab Take 1 tablet by mouth daily.        Past Medical History:   Diagnosis Date    Anxiety 1997    only when highly stressed    Atypical mole 2004    NOT OFTEN    Back pain 2020    exercise injuries    Fatigue 2002    managing nutritionally    Frequent urination 1978    Heart palpitations fine as long as avoid lots of sugar    Hemorrhoids     History of depression 1997    some but not major    Leaking of urine 2012    managing with exercise and training    Neck pain     PT, periodic    Stress 2017    family issues-kids/mom    Vitamin D deficiency 2016    Wears glasses 2012    reading      Past Surgical History:   Procedure Laterality Date    OTHER SURGICAL HISTORY  21 y/o    4 wisdom teeth      Family History   Problem Relation Age of Onset    Heart Disease Father     Other Father         aortic aneurysm    Other (Alzheimers) Mother     Mental Disorder Mother         alzheimers    Cancer Maternal Grandmother 86        colon    Other (heart failure) Maternal Grandmother     Other (Liver Cancer) Maternal Grandfather     Colon Cancer Maternal Grandfather     Other (Alzheimers) Paternal Grandmother     Diabetes Paternal Grandmother     Other (Heart failure) Paternal  Grandfather     Breast Cancer Neg       Social History:   Social History     Socioeconomic History    Marital status:    Occupational History    Occupation: Customer Service Mgt     Employer: [employer]   Tobacco Use    Smoking status: Never    Smokeless tobacco: Never   Substance and Sexual Activity    Alcohol use: Yes     Alcohol/week: 0.0 standard drinks of alcohol     Comment: not frequently    Drug use: No    Sexual activity: Yes     Partners: Male     Birth control/protection: Condom   Other Topics Concern     Service No    Blood Transfusions No    Caffeine Concern No    Occupational Exposure No    Hobby Hazards No    Sleep Concern No    Stress Concern No    Weight Concern No    Special Diet No    Back Care No    Exercise Yes    Bike Helmet No    Seat Belt No    Self-Exams No     Occ:  : . Children:2  Exercise:  regular work outs  .  Diet: watches calories closely     REVIEW OF SYSTEMS:   GENERAL: feels well otherwise  SKIN: denies any unusual skin lesions  EYES:denies blurred vision or double vision  HEENT: denies nasal congestion, sinus pain or ST  LUNGS: denies shortness of breath with exertion  CARDIOVASCULAR: denies chest pain on exertion  GI: denies abdominal pain,denies heartburn  : denies dysuria, vaginal discharge or itching  MUSCULOSKELETAL: denies back pain  NEURO: denies headaches  PSYCHE: denies depression or anxiety  HEMATOLOGIC: denies hx of anemia  ENDOCRINE: denies thyroid history  ALL/ASTHMA: denies hx of allergy or asthma    EXAM:   /78   Pulse 92   Resp 16   Ht 5' 8.5\" (1.74 m)   Wt 170 lb (77.1 kg)   LMP 01/29/2024   SpO2 97%   BMI 25.47 kg/m²   Body mass index is 25.47 kg/m².   GENERAL: alert and oriented X 3, well developed, well nourished,in no apparent distress  CARDIO: RRR 2/6 murmur  LUNGS: clear to auscultation  NECK: supple,no adenopathy,no thyromegaly  HEENT: atraumatic, normocephalic,ears and throat are clear  EYES:PERRLA, EOMI,  normal,conjunctiva are clear  SKIN: norashes,no suspicious lesions  GI: good BS's,no masses, HSM or tenderness  CMUSCULOSKELETAL: back is not tender,FROM of the back  EXTREMITIES: no cyanosis, clubbing or edema  NEURO: cranial nerves are intact,motor and sensory are grossly intact    ASSESSMENT AND PLAN:   Tereza Oconnell is a 53 year old female who presents     1. Hormone replacement therapy (HRT)      2. Encounter for screening mammogram for high-risk patient    - [facility] SONJA 2D+3D SCREENING BILAT (CPT=77067/38618); Future    3. Newly recognized heart murmur    - CARD ECHO 2D DOPPLER (CPT=93306); Future    4. Palpitations    - Iron And Tibc [E]; Future  - Ferritin [E]; Future  - CBC With Differential With Platelet; Future  - Comp Metabolic Panel (14); Future  - Magnesium [E]; Future    5. Elevated cholesterol    - Lipid Panel; Future    6. Hyperglycemia    - Hemoglobin A1C; Future    7. Bilateral hearing loss, unspecified hearing loss type    - ENT Referral - In Network      Questions answered and patient indicates understanding of these issues and agrees to the plan.  Follow up in 6 mo or sooner if needed.

## 2024-02-13 ENCOUNTER — HOSPITAL ENCOUNTER (OUTPATIENT)
Dept: CV DIAGNOSTICS | Facility: HOSPITAL | Age: 54
Discharge: HOME | End: 2024-02-13
Attending: FAMILY MEDICINE
Payer: OTHER GOVERNMENT

## 2024-02-13 DIAGNOSIS — R01.1 NEWLY RECOGNIZED HEART MURMUR: ICD-10-CM

## 2024-02-13 PROCEDURE — 93306 TTE W/DOPPLER COMPLETE: CPT | Performed by: FAMILY MEDICINE

## 2024-03-08 ENCOUNTER — OFFICE VISIT (OUTPATIENT)
Facility: LOCATION | Age: 54
End: 2024-03-08
Payer: OTHER GOVERNMENT

## 2024-03-08 DIAGNOSIS — H90.3 SENSORINEURAL HEARING LOSS (SNHL) OF BOTH EARS: Primary | ICD-10-CM

## 2024-03-08 DIAGNOSIS — H93.293 ABNORMAL AUDITORY PERCEPTION OF BOTH EARS: Primary | ICD-10-CM

## 2024-03-08 DIAGNOSIS — H61.23 BILATERAL IMPACTED CERUMEN: ICD-10-CM

## 2024-03-08 PROCEDURE — 92567 TYMPANOMETRY: CPT | Performed by: AUDIOLOGIST

## 2024-03-08 PROCEDURE — 92557 COMPREHENSIVE HEARING TEST: CPT | Performed by: AUDIOLOGIST

## 2024-03-08 PROCEDURE — 92504 EAR MICROSCOPY EXAMINATION: CPT | Performed by: OTOLARYNGOLOGY

## 2024-03-08 PROCEDURE — 99204 OFFICE O/P NEW MOD 45 MIN: CPT | Performed by: OTOLARYNGOLOGY

## 2024-03-08 NOTE — PROGRESS NOTES
Tereza Oconnell is a 54 year old female. No chief complaint on file.    HPI:   54-year-old white female with gradual decrease in hearing no otorrhea otalgia vertigo or tinnitus.  Notices it in certain situations.  No family history for hearing loss.  Current Outpatient Medications   Medication Sig Dispense Refill    progesterone 100 MG Oral Cap Take 1 capsule (100 mg total) by mouth nightly.      Cholecalciferol 50 MCG (2000 UT) Oral Cap Take by mouth As Directed.      ZINC OR Use as directed in the mouth or throat.      Multiple Vitamins-Minerals (MULTI-VITAMIN/MINERALS) Oral Tab Take 1 tablet by mouth daily.        Past Medical History:   Diagnosis Date    Anxiety 1997    only when highly stressed    Atypical mole 2004    NOT OFTEN    Back pain 2020    exercise injuries    Fatigue 2002    managing nutritionally    Frequent urination 1978    Heart palpitations fine as long as avoid lots of sugar    Hemorrhoids     History of depression 1997    some but not major    Leaking of urine 2012    managing with exercise and training    Neck pain     PT, periodic    Stress 2017    family issues-kids/mom    Vitamin D deficiency 2016    Wears glasses 2012    reading      Social History:  Social History     Socioeconomic History    Marital status:    Occupational History    Occupation: Customer Service Mgt     Employer: [employer]   Tobacco Use    Smoking status: Never    Smokeless tobacco: Never   Substance and Sexual Activity    Alcohol use: Yes     Alcohol/week: 0.0 standard drinks of alcohol     Comment: not frequently    Drug use: No    Sexual activity: Yes     Partners: Male     Birth control/protection: Condom   Other Topics Concern     Service No    Blood Transfusions No    Caffeine Concern No    Occupational Exposure No    Hobby Hazards No    Sleep Concern No    Stress Concern No    Weight Concern No    Special Diet No    Back Care No    Exercise Yes    Bike Helmet No    Seat Belt  No    Self-Exams No      Past Surgical History:   Procedure Laterality Date    OTHER SURGICAL HISTORY  21 y/o    4 wisdom teeth         REVIEW OF SYSTEMS:   GENERAL HEALTH: feels well otherwise  GENERAL : denies fever, chills, sweats, weight loss, weight gain  SKIN: denies any unusual skin lesions or rashes  RESPIRATORY: denies shortness of breath with exertion  NEURO: denies headaches    EXAM:   [facility] 01/29/2024     System Pertinent findings Details   Constitutional  Overall appearance - Normal.   Psychiatric  Orientation - Oriented to time, place, person & situation. Appropriate mood and affect.   Head/Face  Facial features -- Normal. Skull - Normal.   Eyes  Pupils equal ,round ,react to light and accomidate   Ears  External Ear Right: Normal, Left: Normal. Canal - Right: Normal, Left: Normal. TM - Right: Hard wax removed TM normal left: Hard wax Ouimet removed TM normal   Nose  External Nose, Normal, Septum -midline nasal Vault, clear,Turbinates - Right: Normal left: Normal   Mouth/Throat  Lips/teeth/gums - Normal. Tonsils -1+. Oropharynx - Normal.   Neck Exam  Inspection - Normal. Palpation - Normal. Parotid gland - Normal. Thyroid gland -normal   Neurological  Cranial nerves - Cranial nerves II through XII grossly intact.   Nasopharynx   Normal        Lymph Detail  Submental. Submandibular. Anterior cervical. Posterior cervical. Supraclavicular.   Patients exam showed wax impaction right ear, wax impaction left ear. Ear wax was removed under the operative microscope. No complications. Patient tolerated the procedure well. Ear exam findings listed in note after removal.  Audiogram shows high-frequency sensorineural hearing loss mild left ear greater than right ear good discrimination involves only the 6008 1000 Hz.    ASSESSMENT AND PLAN:   1. Sensorineural hearing loss (SNHL) of both ears  1 year follow-up audiogram    2. Bilateral impacted cerumen  As needed follow-up      The patient indicates understanding of  these issues and agrees to the plan.      Skyler Art MD  3/8/2024  11:38 AM

## 2024-03-08 NOTE — PROGRESS NOTES
Tereza Oconnell was seen for an audiometric evaluation and tympanogram today. Referred back to physician.    Recommended communication strategies in the presence of noise.     Mayte Jacques MS, CCC-A

## 2024-03-19 ENCOUNTER — APPOINTMENT (OUTPATIENT)
Dept: OTHER | Facility: HOSPITAL | Age: 54
End: 2024-03-19
Attending: PREVENTIVE MEDICINE

## 2024-06-25 ENCOUNTER — TELEPHONE (OUTPATIENT)
Dept: FAMILY MEDICINE CLINIC | Facility: CLINIC | Age: 54
End: 2024-06-25

## 2024-07-25 ENCOUNTER — PATIENT MESSAGE (OUTPATIENT)
Dept: FAMILY MEDICINE CLINIC | Facility: CLINIC | Age: 54
End: 2024-07-25

## 2024-07-25 NOTE — TELEPHONE ENCOUNTER
From: Tereza Oconnell  To: Theresa Kaiser  Sent: 7/25/2024 3:27 PM CDT  Subject: candida high    Hi,  You recommended I see Ana Calderonjenelle (nutrition) and she did a recent candida test and I scored high on 3 types. See attached. As I've tried Oregano and Candibactin AR/BR. She mentioned sending test result over to you as well. CRP also high. Symptoms of yeast periodically appear in vagina area and breasts but I think it's systemic.     Also continued fatigue and heart palpitations (better with minimal sugar and minimum high glycemic carbs) maybe helped with removing fungal infection...Diflucan? Wanted your opinion.    She also mentioned... \"ask for leptin to check for leptin given weight loss resistance - which could be explained by mold in the home (mycotoxins in urine, elevated c reative protein).\" Larissa wants to retest iron later too.    Will supplement with iron, multivitamin with iodine/ Vit B, selenium, chromium, Vit C (all showed low on hair analysis) . I'm also doing electrolytes, glutathione, Fish oil, Vit D, Magnesium.

## 2024-08-05 ENCOUNTER — OFFICE VISIT (OUTPATIENT)
Dept: FAMILY MEDICINE CLINIC | Facility: CLINIC | Age: 54
End: 2024-08-05
Payer: OTHER GOVERNMENT

## 2024-08-05 ENCOUNTER — LAB ENCOUNTER (OUTPATIENT)
Dept: LAB | Age: 54
End: 2024-08-05
Attending: STUDENT IN AN ORGANIZED HEALTH CARE EDUCATION/TRAINING PROGRAM
Payer: OTHER GOVERNMENT

## 2024-08-05 VITALS
HEIGHT: 68.5 IN | TEMPERATURE: 98 F | SYSTOLIC BLOOD PRESSURE: 112 MMHG | DIASTOLIC BLOOD PRESSURE: 70 MMHG | BODY MASS INDEX: 26.37 KG/M2 | RESPIRATION RATE: 18 BRPM | OXYGEN SATURATION: 99 % | WEIGHT: 176 LBS | HEART RATE: 78 BPM

## 2024-08-05 DIAGNOSIS — B37.9 CANDIDA ALBICANS INFECTION: Primary | ICD-10-CM

## 2024-08-05 DIAGNOSIS — E66.3 OVERWEIGHT (BMI 25.0-29.9): ICD-10-CM

## 2024-08-05 DIAGNOSIS — B37.9 CANDIDA ALBICANS INFECTION: ICD-10-CM

## 2024-08-05 PROCEDURE — 87491 CHLMYD TRACH DNA AMP PROBE: CPT | Performed by: FAMILY MEDICINE

## 2024-08-05 PROCEDURE — 81514 NFCT DS BV&VAGINITIS DNA ALG: CPT | Performed by: FAMILY MEDICINE

## 2024-08-05 PROCEDURE — 87086 URINE CULTURE/COLONY COUNT: CPT

## 2024-08-05 PROCEDURE — 87591 N.GONORRHOEAE DNA AMP PROB: CPT | Performed by: FAMILY MEDICINE

## 2024-08-05 PROCEDURE — 99214 OFFICE O/P EST MOD 30 MIN: CPT | Performed by: FAMILY MEDICINE

## 2024-08-05 RX ORDER — PROGESTERONE 200 MG/1
CAPSULE ORAL
COMMUNITY
Start: 2024-07-30 | End: 2024-08-05 | Stop reason: ALTCHOICE

## 2024-08-05 RX ORDER — FLUCONAZOLE 150 MG/1
150 TABLET ORAL ONCE
Qty: 1 TABLET | Refills: 0 | Status: SHIPPED | OUTPATIENT
Start: 2024-08-05 | End: 2024-08-05

## 2024-08-05 NOTE — PROGRESS NOTES
HISTORY:  Chief Complaint   Patient presents with    Yeast Infection     Pt has abnormal candida test.     For follow up  She has ho of tiredness since age 30   She is seeing dietary   She has been doing different testes to figure it out she says she says she has a finger test done in may and her candida score was high 252 167 147 for igg 1ga 1gm respectively   She denies any fever chills nv   Has denies urinary issues or any vaginal discharge once a while minimal vaginal itich   No suprapubic pain   No back pain no flank pain   No lethargy   No cp or sob   No thrush on tongue   No issues swallowing       No other rash no headache or dizziness      She also follows with cardio and pcp for some intermittent palpitations she says she had echo and holter and they didn't find anything she says she will fu with her cardio and pcp       Walking in hallway good getting up and down the cahir/table ok without any issues   The following portions of the patient's history were reviewed and updated as appropriate:  Past Medical History:    Anxiety    only when highly stressed    Atypical mole    NOT OFTEN    Back pain    exercise injuries    Fatigue    managing nutritionally    Frequent urination    Heart palpitations    Hemorrhoids    History of depression    some but not major    Leaking of urine    managing with exercise and training    Neck pain    PT, periodic    Stress    family issues-kids/mom    Vitamin D deficiency    Wears glasses    reading     Patient Active Problem List   Diagnosis    Special screening for malignant neoplasm of colon     Past Surgical History:   Procedure Laterality Date    Other surgical history  21 y/o    4 wisdom teeth     Family History   Problem Relation Age of Onset    Heart Disease Father     Other Father         aortic aneurysm    Other (Alzheimers) Mother     Mental Disorder Mother         alzheimers    Cancer Maternal Grandmother 86        colon    Other (heart failure) Maternal  Grandmother     Other (Liver Cancer) Maternal Grandfather     Colon Cancer Maternal Grandfather     Other (Alzheimers) Paternal Grandmother     Diabetes Paternal Grandmother     Other (Heart failure) Paternal Grandfather     Breast Cancer Neg      Social History     Socioeconomic History    Marital status:    Occupational History    Occupation: Customer Service Mgt     Employer: [employer]   Tobacco Use    Smoking status: Never    Smokeless tobacco: Never   Substance and Sexual Activity    Alcohol use: Yes     Alcohol/week: 0.0 standard drinks of alcohol     Comment: not frequently    Drug use: No    Sexual activity: Yes     Partners: Male     Birth control/protection: Condom   Other Topics Concern     Service No    Blood Transfusions No    Caffeine Concern No    Occupational Exposure No    Hobby Hazards No    Sleep Concern No    Stress Concern No    Weight Concern No    Special Diet No    Back Care No    Exercise Yes    Bike Helmet No    Seat Belt No    Self-Exams No       Current Outpatient Medications   Medication Sig Dispense Refill    NON FORMULARY one time. Compounded Estradiol Cream      NON FORMULARY one time. Compounded Progesterone Cream      fluconazole (DIFLUCAN) 150 MG Oral Tab Take 1 tablet (150 mg total) by mouth once for 1 dose. 1 tablet 0    Continuous Glucose Sensor (FREESTYLE TANVIR 3 SENSOR) Does not apply Misc 1 each every 14 (fourteen) days. 2 each 2    Continuous Glucose  (FREESTYLE TANVIR 3 READER) Does not apply Misc 1 each continuous. 1 each 0    Multiple Vitamins-Minerals (MULTI-VITAMIN/MINERALS) Oral Tab Take 1 tablet by mouth daily.         No Known Allergies      Review of Systems  Const: Denies fever chills  Eyes: Denies drainage no pain with movement of eye  ENT: denies sore throat,no ear pain ,no sinus drainage  CV: Denies chest pain or palpitations  Pulm: Denies shortness of breath  GI: Denies abdominal pain, nausea, vomiting or diarrhea  : Denies  dysuria  Musculoskeletal: Denies neck or back pain  Skin: Denies rashes  Neuro: Denies focal weakness or numbness,no headache or dizziness  Psych denies depression no si or hi       Vitals:    08/05/24 1133   BP: 112/70   Pulse: 78   Resp: 18   Temp: 98 °F (36.7 °C)   SpO2: 99%   Weight: 176 lb (79.8 kg)   Height: 5' 8.5\" (1.74 m)        Physical Exam  General Appearance:  Alert, oriented, in no acute distress  Eyes no discharge ent sinuses not congested tongue no thrush   Throat no erythema or exudate   Neck ;soft supple,no obvious swelling  CNS: no acute focal neurological deficits  Chest Wall:  no chest wall tenderness.  Lungs:  Normal expansion.  Clear to auscultation.   Heart:  Heart sounds are normal.    Abdomen:  Soft, non-tender, normal bowel sounds;  Psych mood and affect appropriate  skin ;no obvious skin lesion in exposed area  Lower Extremities: No gross edema,  Pelvic bare minimal discharge ?yeast but its bare minimal   Non tender        Assessment/Plan:    Tereza was seen today for yeast infection.    Diagnoses and all orders for this visit:    Candida albicans infection  -     CBC W Differential W Platelet [E]; Future  -     Comp Metabolic Panel (14) [E]; Future  -     Cancel: Infectious Disease Referral - In Network  -     CBC W Differential W Platelet [E]; Future  -     Basic Metabolic Panel (8) [E]; Future  -     Vaginitis Vaginosis PCR Panel; Future  -     Chlamydia/Gc Amplification; Future  -     Vaginitis Vaginosis PCR Panel  -     Chlamydia/Gc Amplification  -     Infectious Disease Referral - In Network  -     Infectious Disease Referral - In Network  -     Urine Culture, Routine [E]; Future  Drink more fluids  Fu with id see them asap   Diflucan 1 dose  Any signs of infection go to er stat red flags dw her  Fu with pcp in 1 week time   Pt will fu with id and pcp     Overweight   Diet exercise dw     Other orders  -     fluconazole (DIFLUCAN) 150 MG Oral Tab; Take 1 tablet (150 mg total) by  mouth once for 1 dose.      Patient Active Problem List   Diagnosis    Special screening for malignant neoplasm of colon       Patient's Body mass index is 26.37 kg/m².    .    Return in about 1 week (around 8/12/2024).      NON FORMULARY, one time. Compounded Estradiol Cream, Disp: , Rfl:     NON FORMULARY, one time. Compounded Progesterone Cream, Disp: , Rfl:     fluconazole (DIFLUCAN) 150 MG Oral Tab, Take 1 tablet (150 mg total) by mouth once for 1 dose., Disp: 1 tablet, Rfl: 0    Continuous Glucose Sensor (FREESTYLE TANVIR 3 SENSOR) Does not apply Misc, 1 each every 14 (fourteen) days., Disp: 2 each, Rfl: 2    Continuous Glucose  (FREESTYLE TANVIR 3 READER) Does not apply Misc, 1 each continuous., Disp: 1 each, Rfl: 0    Multiple Vitamins-Minerals (MULTI-VITAMIN/MINERALS) Oral Tab, Take 1 tablet by mouth daily., Disp: , Rfl:     Todd Tripp MD  8/5/2024

## 2024-08-06 LAB
BV BACTERIA DNA VAG QL NAA+PROBE: NEGATIVE
C GLABRATA DNA VAG QL NAA+PROBE: NEGATIVE
C KRUSEI DNA VAG QL NAA+PROBE: NEGATIVE
C TRACH DNA SPEC QL NAA+PROBE: NEGATIVE
CANDIDA DNA VAG QL NAA+PROBE: NEGATIVE
N GONORRHOEA DNA SPEC QL NAA+PROBE: NEGATIVE
T VAGINALIS DNA VAG QL NAA+PROBE: NEGATIVE

## 2024-08-26 ENCOUNTER — TELEPHONE (OUTPATIENT)
Dept: FAMILY MEDICINE CLINIC | Facility: CLINIC | Age: 54
End: 2024-08-26

## 2024-08-26 NOTE — TELEPHONE ENCOUNTER
Pt has appt sched with [facility] for Candida, but did not receive lab results.  They are cancelling the appt since no record.

## 2024-08-27 ENCOUNTER — TELEPHONE (OUTPATIENT)
Dept: FAMILY MEDICINE CLINIC | Facility: CLINIC | Age: 54
End: 2024-08-27

## 2024-08-27 NOTE — TELEPHONE ENCOUNTER
S/w Dr Medina on this    She does not feel pt needs to see them for this  Reports they do not perform or order the test that pt had (pt self tested on this, see scanned labs)    She explained that the candida albicans is yeast we all have in our body and if overgrowth, usually would have been treated at the time if infection.    I reviewed the scanned result, the results are from end of may.    Also they are not sure if insurance would cover     She had [facility]iam today but they cancelled    I offered to call pt to discuss further.    Per pt her nutritionist is who ordered those tests to see if any correlation with some itching she had, fatigue, and palpitations. Reports she has seen cardio in past but workup did not reveal cause.    Pt reports she will discuss further with her nutritionist.

## 2024-10-02 ENCOUNTER — APPOINTMENT (OUTPATIENT)
Dept: GENERAL RADIOLOGY | Age: 54
End: 2024-10-02
Attending: NURSE PRACTITIONER
Payer: OTHER GOVERNMENT

## 2024-10-02 ENCOUNTER — HOSPITAL ENCOUNTER (OUTPATIENT)
Age: 54
Discharge: HOME | End: 2024-10-02
Payer: OTHER GOVERNMENT

## 2024-10-02 VITALS
TEMPERATURE: 98 F | DIASTOLIC BLOOD PRESSURE: 94 MMHG | HEIGHT: 68 IN | OXYGEN SATURATION: 99 % | WEIGHT: 165 LBS | RESPIRATION RATE: 20 BRPM | HEART RATE: 72 BPM | BODY MASS INDEX: 25.01 KG/M2 | SYSTOLIC BLOOD PRESSURE: 119 MMHG

## 2024-10-02 DIAGNOSIS — R05.3 CHRONIC COUGH: ICD-10-CM

## 2024-10-02 DIAGNOSIS — R07.81 RIB PAIN ON RIGHT SIDE: Primary | ICD-10-CM

## 2024-10-02 DIAGNOSIS — S22.31XA CLOSED FRACTURE OF ONE RIB OF RIGHT SIDE, INITIAL ENCOUNTER: ICD-10-CM

## 2024-10-02 LAB — DDIMER WHOLE BLOOD: <200 NG/ML DDU (ref ?–400)

## 2024-10-02 PROCEDURE — 85378 FIBRIN DEGRADE SEMIQUANT: CPT | Performed by: NURSE PRACTITIONER

## 2024-10-02 PROCEDURE — 71101 X-RAY EXAM UNILAT RIBS/CHEST: CPT | Performed by: NURSE PRACTITIONER

## 2024-10-02 PROCEDURE — 99213 OFFICE O/P EST LOW 20 MIN: CPT | Performed by: NURSE PRACTITIONER

## 2024-10-02 RX ORDER — CODEINE PHOSPHATE AND GUAIFENESIN 10; 100 MG/5ML; MG/5ML
SOLUTION ORAL NIGHTLY PRN
Qty: 70 ML | Refills: 0 | Status: SHIPPED | OUTPATIENT
Start: 2024-10-02 | End: 2024-10-09

## 2024-10-02 NOTE — ED PROVIDER NOTES
Patient Seen in: [facility]      History     Chief Complaint   Patient presents with    Pain     Stated Complaint: Rt side pain    Subjective:   HPI  54-year-old with anxiety, back pain, depression, heart palpitations, and neck pain presents complaining of right lateral rib pain since Saturday morning.  She states that she has had a cough since she had COVID in July.  She denies any shortness of breath.  She states there is point tenderness.  She also states that there is pain when she lies on that side or moves.  She denies any pain with inspiration.    Objective:     Past Medical History:    Anxiety    only when highly stressed    Atypical mole    NOT OFTEN    Back pain    exercise injuries    Fatigue    managing nutritionally    Frequent urination    Heart palpitations    Hemorrhoids    History of depression    some but not major    Leaking of urine    managing with exercise and training    Neck pain    PT, periodic    Stress    family issues-kids/mom    Vitamin D deficiency    Wears glasses    reading              Past Surgical History:   Procedure Laterality Date    Other surgical history  19 y/o    4 wisdom teeth                Social History     Socioeconomic History    Marital status:    Occupational History    Occupation: Customer Service Mgt     Employer: [employer]   Tobacco Use    Smoking status: Never    Smokeless tobacco: Never   Vaping Use    Vaping status: Never Used   Substance and Sexual Activity    Alcohol use: Yes     Alcohol/week: 0.0 standard drinks of alcohol     Comment: not frequently    Drug use: No    Sexual activity: Yes     Partners: Male     Birth control/protection: Condom   Other Topics Concern     Service No    Blood Transfusions No    Caffeine Concern No    Occupational Exposure No    Hobby Hazards No    Sleep Concern No    Stress Concern No    Weight Concern No    Special Diet No    Back Care No    Exercise Yes    Bike Helmet No    Seat  Belt No    Self-Exams No              Physical Exam     ED Triage Vitals [10/02/24 1639]   BP (!) 119/94   Pulse 72   Resp 20   Temp 98.1 °F (36.7 °C)   Temp src Temporal   SpO2 99 %   O2 Device None (Room air)       Current Vitals:   Vital Signs  BP: (!) 119/94  Pulse: 72  Resp: 20  Temp: 98.1 °F (36.7 °C)  Temp src: Temporal    Oxygen Therapy  SpO2: 99 %  O2 Device: None (Room air)        Physical Exam  Vitals and nursing note reviewed.   Constitutional:       General: She is not in acute distress.     Appearance: She is well-developed. She is not ill-appearing or toxic-appearing.   Cardiovascular:      Rate and Rhythm: Normal rate and regular rhythm.      Heart sounds: Normal heart sounds.   Pulmonary:      Effort: Pulmonary effort is normal.      Breath sounds: Normal breath sounds.          Comments: Point tenderness to rib.  No rash.  No crepitus or flail chest.  Skin:     General: Skin is warm and dry.   Neurological:      Mental Status: She is alert and oriented to person, place, and time.         ED Course     Labs Reviewed   D-DIMER (POC) - Normal        XR RIBS WITH CHEST (3 VIEWS), RIGHT  (CPT=71101)    Result Date: 10/2/2024  PROCEDURE:  XR RIBS WITH CHEST (3 VIEWS), RIGHT  (CPT=71101)  TECHNIQUE:  PA Chest and three views of the ribs were obtained  COMPARISON:  None.  INDICATIONS:  right side lateral rib pain, cough since July  PATIENT STATED HISTORY: (As transcribed by Technologist)  Lower right axillary rib pain for 4 days. Dry cough since having Covid in July.    FINDINGS:  Nondisplaced fracture of the anterior right 7th rib. Normal heart size and pulmonary vascularity. No pleural effusion or pneumothorax. No lobar consolidation.            CONCLUSION:  Nondisplaced fracture of the anterior right 7th rib.   LOCATION:  [id number]     Dictated by (CST): Hamilton Helms MD on 10/02/2024 at 5:39 PM     Finalized by (CST): Hamilton Helms MD on 10/02/2024 at 5:40 PM           [facility]       Medical Decision  Making  54-year-old with anxiety, back pain, depression, heart palpitations, and neck pain presents complaining of right lateral rib pain since Saturday morning.  She states that she has had a cough since she had COVID in July.  She denies any shortness of breath.  She states there is point tenderness.  She also states that there is pain when she lies on that side or moves.  She denies any pain with inspiration.    Pertinent Labs & Imaging studies reviewed. (See chart for details).  Patient coming in with rib pain.   Differential diagnosis includes but not limited to rib pain, PE, rib fracture  Radiology Nondisplaced fracture of the anterior right 7th rib.  Will treat for chronic cough, rib fracture, rib pain.  Will discharge on incentive spirometer with Cheratussin AC as needed at bedtime and Tylenol/Motrin. Patient/Parent is comfortable with this plan.    Overall Pt looks good. Non-toxic, well-hydrated and in no respiratory distress. Vital signs are reassuring. Exam is reassuring. I do not believe pt requires and additional diagnostic studies or intervention. I believe pt can be discharged home to continue evaluation as an outpatient. Follow-up provider given. Discharge instructions given and reviewed. Return for any problems. All understand and agree with the plan.        Problems Addressed:  Chronic cough: acute illness or injury  Closed fracture of one rib of right side, initial encounter: acute illness or injury  Rib pain on right side: acute illness or injury    Amount and/or Complexity of Data Reviewed  Radiology: ordered and independent interpretation performed.     Details: Rib x-ray with chest ordered and independently interpreted by myself as no consolidation        Disposition and Plan     Clinical Impression:  1. Rib pain on right side    2. Chronic cough    3. Closed fracture of one rib of right side, initial encounter         Disposition:  Discharge  10/2/2024  5:52 pm    Follow-up:  Florence Messer  [address]  [address]  [facility] [address]  [phone]    Call             Medications Prescribed:  Current Discharge Medication List        START taking these medications    Details   guaiFENesin-codeine 100-10 MG/5ML Oral Solution Take 5-10 mL by mouth nightly as needed for cough.  Qty: 70 mL, Refills: 0    Associated Diagnoses: Closed fracture of one rib of right side, initial encounter                 Supplementary Documentation:

## 2024-10-02 NOTE — ED INITIAL ASSESSMENT (HPI)
Pt c/o right side rib pain... pt states she is sore from coughing. Pt states she had covid in July and continues to have a dry cough.. pt denies fever..pt played tennis on Friday and awoke Saturday with right sided rib pain... pt denies shortness of breath..

## 2024-10-02 NOTE — DISCHARGE INSTRUCTIONS
Take cough syrup at bedtime as needed. Do not drive for 6-8 hours after taking.  Use incentive spirometer 10 times/hour while awake.  Follow up with primary or pulmonology due to persistent cough.

## 2024-10-05 ENCOUNTER — PATIENT MESSAGE (OUTPATIENT)
Dept: FAMILY MEDICINE CLINIC | Facility: CLINIC | Age: 54
End: 2024-10-05

## 2024-10-05 DIAGNOSIS — Z13.820 ENCOUNTER FOR SCREENING FOR OSTEOPOROSIS: Primary | ICD-10-CM

## 2024-10-07 NOTE — TELEPHONE ENCOUNTER
From: Tereza Oconnell  To: Theresa Kaiser  Sent: 10/5/2024 9:52 PM CDT  Subject: Cracked rib    Hi,  I see in my chart it has the urgent care test results that said I have a cracked rib. WE think it was from me coughing as I'm' 8+ weeks post COVID and still coughing...however I'm not coughing hard and am concerned about this result.    I haven't done a DEXA scan yet so I'm hoping you think that might be a good idea. I would like to get a TBS as well to see my bone quality and not just the density. I have some nearby facilities on a list.         Also, can I stop the reminders about Mammograms that keep getting mailed to me? I had a mammogram on March 11 but it's still sending me reminders...rrrr.

## 2024-10-08 NOTE — TELEPHONE ENCOUNTER
Health maintainence updated with mammogram date/result from [facility].    Dexascan ordered    Please review xray rib

## 2024-10-09 NOTE — TELEPHONE ENCOUNTER
Left message on voicemail for pt instructing if concerns to call us and schedule appointment with Dr Kaiser.

## 2024-10-11 ENCOUNTER — OFFICE VISIT (OUTPATIENT)
Dept: FAMILY MEDICINE CLINIC | Facility: CLINIC | Age: 54
End: 2024-10-11
Payer: OTHER GOVERNMENT

## 2024-10-11 VITALS
OXYGEN SATURATION: 98 % | DIASTOLIC BLOOD PRESSURE: 74 MMHG | RESPIRATION RATE: 16 BRPM | HEIGHT: 68 IN | HEART RATE: 78 BPM | WEIGHT: 160 LBS | SYSTOLIC BLOOD PRESSURE: 104 MMHG | BODY MASS INDEX: 24.25 KG/M2

## 2024-10-11 DIAGNOSIS — S22.31XA CLOSED FRACTURE OF ONE RIB OF RIGHT SIDE, INITIAL ENCOUNTER: Primary | ICD-10-CM

## 2024-10-11 PROCEDURE — 99213 OFFICE O/P EST LOW 20 MIN: CPT | Performed by: FAMILY MEDICINE

## 2024-10-11 NOTE — PROGRESS NOTES
HPI:   Tereza Oconnell is a 54 year old female who presents rib fracture     Pt developed covid end of August   Had been coughing for a while and then noted left rib pain 2 weeks ago   In UC pt was found to have rib fracture     Pt wants TBS scan in addition to a bone density   Ibuprofen prn   Can take deep breaths       Current Outpatient Medications   Medication Sig Dispense Refill    NON FORMULARY one time. Compounded Estradiol Cream      NON FORMULARY one time. Compounded Progesterone Cream      Continuous Glucose Sensor (FREESTYLE TANVIR 3 SENSOR) Does not apply Misc 1 each every 14 (fourteen) days. 2 each 2    Continuous Glucose  (FREESTYLE TANVIR 3 READER) Does not apply Misc 1 each continuous. 1 each 0    Multiple Vitamins-Minerals (MULTI-VITAMIN/MINERALS) Oral Tab Take 1 tablet by mouth daily.        Past Medical History:    Anxiety    only when highly stressed    Atypical mole    NOT OFTEN    Back pain    exercise injuries    Fatigue    managing nutritionally    Frequent urination    Heart palpitations    Hemorrhoids    History of depression    some but not major    Leaking of urine    managing with exercise and training    Neck pain    PT, periodic    Stress    family issues-kids/mom    Vitamin D deficiency    Wears glasses    reading      Past Surgical History:   Procedure Laterality Date    Other surgical history  19 y/o    4 wisdom teeth      Family History   Problem Relation Age of Onset    Heart Disease Father     Other Father         aortic aneurysm    Other (Alzheimers) Mother     Mental Disorder Mother         alzheimers    Cancer Maternal Grandmother 86        colon    Other (heart failure) Maternal Grandmother     Other (Liver Cancer) Maternal Grandfather     Colon Cancer Maternal Grandfather     Other (Alzheimers) Paternal Grandmother     Diabetes Paternal Grandmother     Other (Heart failure) Paternal Grandfather     Breast Cancer Neg       Social History:   Social History      Socioeconomic History    Marital status:    Occupational History    Occupation: Customer Service Mgt     Employer: [employer]   Tobacco Use    Smoking status: Never    Smokeless tobacco: Never   Vaping Use    Vaping status: Never Used   Substance and Sexual Activity    Alcohol use: Yes     Alcohol/week: 0.0 standard drinks of alcohol     Comment: not frequently    Drug use: No    Sexual activity: Yes     Partners: Male     Birth control/protection: Condom   Other Topics Concern     Service No    Blood Transfusions No    Caffeine Concern No    Occupational Exposure No    Hobby Hazards No    Sleep Concern No    Stress Concern No    Weight Concern No    Special Diet No    Back Care No    Exercise Yes    Bike Helmet No    Seat Belt No    Self-Exams No     Occ:  : . Children:2  Exercise:  regular work outs  .  Diet: watches calories closely     REVIEW OF SYSTEMS:   GENERAL: feels well otherwise  SKIN: denies any unusual skin lesions  EYES:denies blurred vision or double vision  HEENT: denies nasal congestion, sinus pain or ST  LUNGS: denies shortness of breath with exertion  CARDIOVASCULAR: denies chest pain on exertion  GI: denies abdominal pain,denies heartburn  : denies dysuria, vaginal discharge or itching  MUSCULOSKELETAL: denies back pain  NEURO: denies headaches  PSYCHE: denies depression or anxiety  HEMATOLOGIC: denies hx of anemia  ENDOCRINE: denies thyroid history  ALL/ASTHMA: denies hx of allergy or asthma    EXAM:   /74   Pulse 78   Resp 16   Ht 5' 8\" (1.727 m)   Wt 160 lb (72.6 kg)   LMP 09/11/2024   SpO2 98%   BMI 24.33 kg/m²   Body mass index is 24.33 kg/m².   GENERAL: alert and oriented X 3, well developed, well nourished,in no apparent distress  CARDIO: RRR 2/6 murmur  LUNGS: clear to auscultation  NECK: supple,no adenopathy,no thyromegaly  SKIN: norashes,no suspicious lesions  MUSCULOSKELETAL: back is not tender,FROM of the back, right rib pain to  axillary line   EXTREMITIES: no cyanosis, clubbing or edema  NEURO: cranial nerves are intact,motor and sensory are grossly intact    ASSESSMENT AND PLAN:   Tereza Reza Anish is a 54 year old female who presents with     1. Closed fracture of one rib of right side, initial encounter  Order for dexa and TBS - pt will have at outside institution       Questions answered and patient indicates understanding of these issues and agrees to the plan.  Follow up in 6 mo or sooner if needed.

## 2024-11-08 ENCOUNTER — TELEPHONE (OUTPATIENT)
Dept: FAMILY MEDICINE CLINIC | Facility: CLINIC | Age: 54
End: 2024-11-08

## 2024-11-08 DIAGNOSIS — Z23 NEED FOR VACCINATION: Primary | ICD-10-CM

## 2024-11-11 NOTE — TELEPHONE ENCOUNTER
Per mychart sent--pt had Hep A#1 in march, not Hep B, needs Hep A#2    I pended.    Has nurse vs tomorrow    Please approve if appropriate. Thanks.

## 2024-11-12 ENCOUNTER — NURSE ONLY (OUTPATIENT)
Dept: FAMILY MEDICINE CLINIC | Facility: CLINIC | Age: 54
End: 2024-11-12
Payer: OTHER GOVERNMENT

## 2024-11-12 PROCEDURE — 90632 HEPA VACCINE ADULT IM: CPT | Performed by: FAMILY MEDICINE

## 2024-11-12 PROCEDURE — 90471 IMMUNIZATION ADMIN: CPT | Performed by: FAMILY MEDICINE

## 2025-03-13 ENCOUNTER — TELEPHONE (OUTPATIENT)
Dept: FAMILY MEDICINE CLINIC | Facility: CLINIC | Age: 55
End: 2025-03-13

## 2025-03-14 ENCOUNTER — NURSE ONLY (OUTPATIENT)
Dept: FAMILY MEDICINE CLINIC | Facility: CLINIC | Age: 55
End: 2025-03-14
Payer: OTHER GOVERNMENT

## 2025-03-14 PROCEDURE — 90471 IMMUNIZATION ADMIN: CPT | Performed by: FAMILY MEDICINE

## 2025-03-14 PROCEDURE — 90750 HZV VACC RECOMBINANT IM: CPT | Performed by: FAMILY MEDICINE

## 2025-04-29 ENCOUNTER — TELEPHONE (OUTPATIENT)
Dept: FAMILY MEDICINE CLINIC | Facility: CLINIC | Age: 55
End: 2025-04-29

## 2025-04-29 NOTE — TELEPHONE ENCOUNTER
Patient stopped in office to see if requested A1C and hs-CRP tests were ordered per MCM sent 4/28, also she did not go to UC so she still has the UTI symptoms, please advise no appts to offer

## 2025-05-23 ENCOUNTER — NURSE ONLY (OUTPATIENT)
Dept: FAMILY MEDICINE CLINIC | Facility: CLINIC | Age: 55
End: 2025-05-23
Payer: OTHER GOVERNMENT

## 2025-05-23 PROCEDURE — 90750 HZV VACC RECOMBINANT IM: CPT | Performed by: FAMILY MEDICINE

## 2025-05-23 PROCEDURE — 90471 IMMUNIZATION ADMIN: CPT | Performed by: FAMILY MEDICINE

## (undated) NOTE — LETTER
07/07/21        [facility] [address]      Dear Anu Glynn,    [facility] records indicate that you have outstanding lab work and or testing that was ordered for you and has not yet been completed:  Orders Placed This E

## (undated) NOTE — LETTER
2020    To Whom It May Concern,    Ross Davidson,  [birth year], is currently under my care. On 2019 a vitamin D lab was ordered for her. Al Palmer has a history of vitamin D deficiency.   To ensure that she is within the normal limit for vit

## (undated) NOTE — LETTER
07/07/21        [place] [address]      Dear Sanford Curling,    [facility] records indicate that you have outstanding lab work and or testing that was ordered for you and has not yet been completed:  Orders Placed This E

## (undated) NOTE — LETTER
06/18/20        [facility] [address]      Dear Nacho Kwong,    [address] records indicate that you have outstanding lab work and or testing that was ordered for you and has not yet been completed:  Orders Placed This E